# Patient Record
Sex: MALE | Race: WHITE | NOT HISPANIC OR LATINO | Employment: OTHER | URBAN - METROPOLITAN AREA
[De-identification: names, ages, dates, MRNs, and addresses within clinical notes are randomized per-mention and may not be internally consistent; named-entity substitution may affect disease eponyms.]

---

## 2020-09-10 ENCOUNTER — TELEPHONE (OUTPATIENT)
Dept: BARIATRICS/WEIGHT MGMT | Facility: CLINIC | Age: 40
End: 2020-09-10

## 2020-10-16 ENCOUNTER — TELEPHONE (OUTPATIENT)
Dept: BARIATRICS/WEIGHT MGMT | Facility: CLINIC | Age: 40
End: 2020-10-16

## 2020-11-04 ENCOUNTER — OFFICE VISIT (OUTPATIENT)
Dept: BARIATRICS/WEIGHT MGMT | Facility: CLINIC | Age: 40
End: 2020-11-04

## 2020-11-04 VITALS
OXYGEN SATURATION: 18 % | WEIGHT: 315 LBS | TEMPERATURE: 97.6 F | HEIGHT: 72 IN | HEART RATE: 87 BPM | RESPIRATION RATE: 18 BRPM | BODY MASS INDEX: 42.66 KG/M2 | DIASTOLIC BLOOD PRESSURE: 94 MMHG | SYSTOLIC BLOOD PRESSURE: 140 MMHG

## 2020-11-04 DIAGNOSIS — E66.01 OBESITY, CLASS III, BMI 40-49.9 (MORBID OBESITY) (HCC): Primary | ICD-10-CM

## 2020-11-04 PROCEDURE — 99203 OFFICE O/P NEW LOW 30 MIN: CPT | Performed by: NURSE PRACTITIONER

## 2020-11-04 RX ORDER — HYDROCHLOROTHIAZIDE 25 MG/1
25 TABLET ORAL DAILY
COMMUNITY
Start: 2020-09-01 | End: 2021-03-26 | Stop reason: SDUPTHER

## 2020-11-04 RX ORDER — OMEPRAZOLE 40 MG/1
40 CAPSULE, DELAYED RELEASE ORAL DAILY
Qty: 30 CAPSULE | Refills: 2 | Status: SHIPPED | OUTPATIENT
Start: 2020-11-04 | End: 2020-12-01

## 2020-11-04 RX ORDER — MULTIPLE VITAMINS W/ MINERALS TAB 9MG-400MCG
1 TAB ORAL DAILY
COMMUNITY
End: 2020-12-01

## 2020-11-04 NOTE — PROGRESS NOTES
[facility] WEIGHT [facility]NAGEMENT  [address] [place] [address]  [address] [place] [address]  Dept: [phone]  11/4/2020      Manish Castillo.  [id number]  [id number]  1980  male      Chief Complaint   Patient presents with   • Consult     SWL #1       The patient is here for month 1 of their pre-operative physician supervised diet. He had a current weight of 338 pounds. The patient states that he is following the recommendations given by our office and dietician including a high lean protein, low carb and low fat diet. We recommended adequate fruits and vegetable intake along with limited portion sizes. Patient is working on eliminating fast foods, fried foods, sweets and soda. Manish Castillo has been increasing his daily water intake. He has not been exercising.     Patient states they have made positive changes including working on cutting out sodas, no cigarette smoking   The patient admits to be struggling with mexican food and greasy, fatty foods     Gained weight ever since 1993 when had tonsils out     Breakfast: pyle egg and cheese sandwich, fried foods   Lunch: microwave meals- sausage egg and cheese foods, mexican foods   Dinner: 2 double beef and cheddar burgers  Snacks: taco bell and papa johns , varies   Drinks: milk 1 gallon a day, sodas PRN, water, orange juice   Exercise: limited due pain     Uses Marijuana  For pain control           Review of Systems   Constitutional: Positive for appetite change, fatigue and unexpected weight change.        Flori eating, large portion size    Respiratory: Negative.    Cardiovascular: Negative.    Gastrointestinal: Negative.    Musculoskeletal: Positive for arthralgias, back pain and myalgias.     Vitals:    11/04/20 1334   BP: 140/94   Pulse: 87   Resp: 18   Temp: 97.6 °F (36.4 °C)   SpO2: (!) 18%   O2 entered in error- actual o2 level 98%     There is no problem list on file for  this patient.    Body mass index is 45.52 kg/m².    The following portions of the patient's history were reviewed and updated as appropriate: active problem list, medication list, allergies, family history    Physical Exam  Constitutional:       Appearance: Normal appearance. He is obese.   Cardiovascular:      Rate and Rhythm: Normal rate and regular rhythm.   Pulmonary:      Effort: Pulmonary effort is normal.      Breath sounds: Normal breath sounds.   Abdominal:      General: Abdomen is flat. Bowel sounds are normal.      Palpations: Abdomen is soft.   Skin:     General: Skin is warm and dry.   Neurological:      General: No focal deficit present.      Mental Status: He is alert and oriented to person, place, and time. Mental status is at baseline.   Psychiatric:      Comments: Pt seems very anxious and on edge. Pt states he has a hx of PTSD and is easily set off by what people say or long lines. Pt does not currently see a counselor          Discussion/Plan:    New goals:  Eating and drinking separately with 1 meals/ day  Work on cutting out sodas     Pt having a lot of GERD- will prescribe omeprazole 40 mg daily for patient.     Obesity/Morbid Obesity: Currently the patient's weight is 338 pounds. There are no medications prescribed.Treatment plan includes prescribed diet, prescribed exercise regimen and behavior modification.    I reviewed the appropriate dietary choices with the patient and encouraged the necessary changes. Recommended at least 70 grams of protein per day, around 35 grams of fats and less than 100 grams of carbohydrates. Reviewed calorie intake if patient wanted to calorie count and/or had BMR. Instructed patient to drink half of body weight in ounces per day and exercise a minimum of 150 minutes per week including both cardio and strength training. Discussed the option of keeping a food journal which will help patient become more aware of the nutritional value of foods so they are more  prepared after surgery.    The patient was given written materials from our office for education.   I answered all of the patients questions regarding dietary changes, exercise or surgical options.  The patient will follow up in 1 month for Intake on December 1.     The total time spent face to face was 30 minutes with 25 minutes spent counseling.    Nuzhat Barksdale APRCECELIA  [facility]

## 2020-11-30 DIAGNOSIS — E66.01 OBESITY, CLASS III, BMI 40-49.9 (MORBID OBESITY) (HCC): Primary | ICD-10-CM

## 2020-11-30 DIAGNOSIS — R63.5 ABNORMAL WEIGHT GAIN: ICD-10-CM

## 2020-11-30 DIAGNOSIS — R79.9 ABNORMAL FINDING OF BLOOD CHEMISTRY, UNSPECIFIED: ICD-10-CM

## 2020-11-30 DIAGNOSIS — M25.50 ARTHRALGIA, UNSPECIFIED JOINT: ICD-10-CM

## 2020-12-01 ENCOUNTER — OFFICE VISIT (OUTPATIENT)
Dept: BARIATRICS/WEIGHT MGMT | Facility: CLINIC | Age: 40
End: 2020-12-01

## 2020-12-01 ENCOUNTER — OFFICE VISIT (OUTPATIENT)
Dept: PSYCHIATRY | Facility: CLINIC | Age: 40
End: 2020-12-01

## 2020-12-01 VITALS — HEIGHT: 72 IN | WEIGHT: 315 LBS | BODY MASS INDEX: 42.66 KG/M2

## 2020-12-01 DIAGNOSIS — Z01.818 PRE-OPERATIVE EXAMINATION: ICD-10-CM

## 2020-12-01 DIAGNOSIS — E66.01 MORBID OBESITY (HCC): Primary | ICD-10-CM

## 2020-12-01 DIAGNOSIS — E66.01 OBESITY, CLASS III, BMI 40-49.9 (MORBID OBESITY) (HCC): Primary | ICD-10-CM

## 2020-12-01 PROCEDURE — 90791 PSYCH DIAGNOSTIC EVALUATION: CPT | Performed by: PSYCHIATRY & NEUROLOGY

## 2020-12-01 PROCEDURE — 99443 PR PHYS/QHP TELEPHONE EVALUATION 21-30 MIN: CPT | Performed by: NURSE PRACTITIONER
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
## 2020-12-01 NOTE — PATIENT INSTRUCTIONS
"BMI for Adults  What is BMI?  Body mass index (BMI) is a number that is calculated from a person's weight and height. BMI can help estimate how much of a person's weight is composed of fat. BMI does not measure body fat directly. Rather, it is an alternative to procedures that directly measure body fat, which can be difficult and expensive.  BMI can help identify people who may be at higher risk for certain medical problems.  What are BMI measurements used for?  BMI is used as a screening tool to identify possible weight problems. It helps determine whether a person is obese, overweight, a healthy weight, or underweight.  BMI is useful for:  · Identifying a weight problem that may be related to a medical condition or may increase the risk for medical problems.  · Promoting changes, such as changes in diet and exercise, to help reach a healthy weight. BMI screening can be repeated to see if these changes are working.  How is BMI calculated?  BMI involves measuring your weight in relation to your height. Both height and weight are measured, and the BMI is calculated from those numbers. This can be done either in English (U.S.) or metric measurements. Note that charts and online BMI calculators are available to help you find your BMI quickly and easily without having to do these calculations yourself.  To calculate your BMI in English (U.S.) measurements:    1. Measure your weight in pounds (lb).  2. Multiply the number of pounds by 703.  ? For example, for a person who weighs 180 lb, multiply that number by 703, which equals 126,540.  3. Measure your height in inches. Then multiply that number by itself to get a measurement called \"inches squared.\"  ? For example, for a person who is 70 inches tall, the \"inches squared\" measurement is 70 inches x 70 inches, which equals 4,900 inches squared.  4. Divide the total from step 2 (number of lb x 703) by the total from step 3 (inches squared): 126,540 ÷ 4,900 = 25.8. This is " "your BMI.  To calculate your BMI in metric measurements:  1. Measure your weight in kilograms (kg).  2. Measure your height in meters (m). Then multiply that number by itself to get a measurement called \"meters squared.\"  ? For example, for a person who is 1.75 m tall, the \"meters squared\" measurement is 1.75 m x 1.75 m, which is equal to 3.1 meters squared.  3. Divide the number of kilograms (your weight) by the meters squared number. In this example: 70 ÷ 3.1 = 22.6. This is your BMI.  What do the results mean?  BMI charts are used to identify whether you are underweight, normal weight, overweight, or obese. The following guidelines will be used:  · Underweight: BMI less than 18.5.  · Normal weight: BMI between 18.5 and 24.9.  · Overweight: BMI between 25 and 29.9.  · Obese: BMI of 30 or above.  Keep these notes in mind:  · Weight includes both fat and muscle, so someone with a muscular build, such as an athlete, may have a BMI that is higher than 24.9. In cases like these, BMI is not an accurate measure of body fat.  · To determine if excess body fat is the cause of a BMI of 25 or higher, further assessments may need to be done by a health care provider.  · BMI is usually interpreted in the same way for men and women.  Where to find more information  For more information about BMI, including tools to quickly calculate your BMI, go to these websites:  · Centers for Disease Control and Prevention: www.cdc.gov  · American Heart Association: www.heart.org  · National Heart, Lung, and Blood [place]: www.nhlbi.nih.gov  Summary  · Body mass index (BMI) is a number that is calculated from a person's weight and height.  · BMI may help estimate how much of a person's weight is composed of fat. BMI can help identify those who may be at higher risk for certain medical problems.  · BMI can be measured using English measurements or metric measurements.  · BMI charts are used to identify whether you are underweight, normal " weight, overweight, or obese.  This information is not intended to replace advice given to you by your health care provider. Make sure you discuss any questions you have with your health care provider.  Document Revised: 09/09/2020 Document Reviewed: 07/17/2020  Elsevier Patient Education © 2020 [employer]
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
## 2020-12-02 DIAGNOSIS — E66.01 OBESITY, CLASS III, BMI 40-49.9 (MORBID OBESITY) (HCC): ICD-10-CM

## 2020-12-21 ENCOUNTER — OFFICE VISIT (OUTPATIENT)
Dept: CARDIOLOGY | Facility: CLINIC | Age: 40
End: 2020-12-21

## 2020-12-21 VITALS
TEMPERATURE: 97.8 F | SYSTOLIC BLOOD PRESSURE: 140 MMHG | HEART RATE: 101 BPM | HEIGHT: 72 IN | WEIGHT: 315 LBS | BODY MASS INDEX: 42.66 KG/M2 | OXYGEN SATURATION: 98 % | DIASTOLIC BLOOD PRESSURE: 97 MMHG

## 2020-12-21 DIAGNOSIS — E66.01 MORBID OBESITY (HCC): ICD-10-CM

## 2020-12-21 DIAGNOSIS — I10 ESSENTIAL HYPERTENSION: ICD-10-CM

## 2020-12-21 DIAGNOSIS — Z01.810 PREOPERATIVE CARDIOVASCULAR EXAMINATION: Primary | ICD-10-CM

## 2020-12-21 DIAGNOSIS — R94.31 ABNORMAL EKG: ICD-10-CM

## 2020-12-21 DIAGNOSIS — R06.09 DYSPNEA ON EXERTION: ICD-10-CM

## 2020-12-21 PROCEDURE — 93000 ELECTROCARDIOGRAM COMPLETE: CPT | Performed by: INTERNAL MEDICINE

## 2020-12-21 PROCEDURE — 99204 OFFICE O/P NEW MOD 45 MIN: CPT | Performed by: INTERNAL MEDICINE

## 2020-12-21 NOTE — PROGRESS NOTES
Subjective:     Encounter Date:12/21/2020      Patient ID: Manish Castillo is a 40 y.o. male.    Referring physician : Dr. Carver  Reason for consult: New patient preop for bariatric surgery, morbid obesity  History of Present Illness      This is a 40-year-old with PMH of    -Hypertension  -Morbid obesity  -On disability due to psych disorder PTSD and panic attacks  -Positive family history of CAD in mother    Here for preop evaluation for bariatric surgery.  Patient is not very active and is on disability.  Has dyspnea on exertion with up hills and stairs and sometimes has shortness of breath at rest at nighttime.  Patient thinks it is because of his smoking.  Patient has hypertension and is gained weight and blood pressure is creeping up.  Denies any chest pain.      Assessment :    Dyspnea on exertion  Preoperative cardiovascular evaluation for bariatric surgery  Abnormal EKG  Morbid obesity  Hypertension  Positive family history of heart disease      Recommendations and plan :    Reviewed EKG results with patient  Advised him to check blood pressure at home and bring readings.  We will follow-up and consider adding antihypertensives.  Patient had labs ordered will review the labs done including CMP, lipid profile and hemoglobin A1c.  We will schedule for stress Cardiolite.  Patient is not sure if he can walk on treadmill.  Will do Lexiscan if he cannot walk.  Counseled on smoking and marijuana cessation.  We will follow-up after testing and consider further evaluation and treatment.      Assessment:          Diagnosis Plan   1. Preoperative cardiovascular examination  Stress Test With Myocardial Perfusion One Day    ECG 12 Lead   2. Dyspnea on exertion  Stress Test With Myocardial Perfusion One Day    ECG 12 Lead   3. Abnormal EKG  Stress Test With Myocardial Perfusion One Day    ECG 12 Lead   4. BMI 45.0-49.9, adult (CMS/[facility])  Stress Test With Myocardial Perfusion One Day    ECG 12 Lead   5. Morbid obesity  (CMS/HCC)  Stress Test With Myocardial Perfusion One Day    ECG 12 Lead   6. Essential hypertension  Stress Test With Myocardial Perfusion One Day    ECG 12 Lead          Plan:         Past Medical History:  Past Medical History:   Diagnosis Date   • Episodic mood disorder (CMS/HCC)    • Hypertension    • Panic attacks    • PTSD (post-traumatic stress disorder)      Past Surgical History:  Past Surgical History:   Procedure Laterality Date   • TONSILLECTOMY  1993      Allergies:  No Known Allergies  Home Meds:  Current Meds:     Current Outpatient Medications:   •  hydroCHLOROthiazide (HYDRODIURIL) 25 MG tablet, Take 25 mg by mouth Daily. 200001, Disp: , Rfl:   Social History:   Social History     Tobacco Use   • Smoking status: Former Smoker     Types: Cigarettes   • Smokeless tobacco: Never Used   Substance Use Topics   • Alcohol use: Yes     Comment: daily- [place] and coke       Family History:  Family History   Problem Relation Age of Onset   • Heart disease Mother    • Hypertension Mother         The following portions of the patient's history were reviewed and updated as appropriate: allergies, current medications, past family history, past medical history, past social history, past surgical history and problem list.  And physical exam      Review of Systems   Constitution: Negative for fever and malaise/fatigue.   HENT: Negative for congestion and hearing loss.    Eyes: Negative for double vision and visual disturbance.   Cardiovascular: Positive for palpitations. Negative for chest pain, claudication, dyspnea on exertion, leg swelling and syncope.   Respiratory: Positive for shortness of breath. Negative for cough.    Endocrine: Negative for cold intolerance.   Skin: Negative for color change and rash.   Musculoskeletal: Negative for arthritis and joint pain.   Gastrointestinal: Negative for abdominal pain and heartburn.   Genitourinary: Negative for hematuria.   Neurological: Negative for excessive  "daytime sleepiness and dizziness.   Psychiatric/Behavioral: Negative for depression. The patient is not nervous/anxious.    All other systems reviewed and are negative.          ECG 12 Lead    Date/Time: 12/21/2020 2:40 PM  Performed by: Darren Nice MD  Authorized by: Darren Nice MD   Comparison: not compared with previous ECG   Previous ECG: no previous ECG available  Comments: EKG done today reviewed by me shows sinus rhythm with rate of 93 bpm with sinus arrhythmia and T wave inversions in inferior leads nonspecific.,  There is no comparison EKG available.               Objective:     Physical Exam  /97 (BP Location: Left arm, Patient Position: Sitting, Cuff Size: Large Adult)   Pulse 101   Temp 97.8 °F (36.6 °C)   Ht 182.9 cm (72\")   Wt (!) 156 kg (345 lb)   SpO2 98%   BMI 46.79 kg/m²   General:  Appears in no acute distress, pleasant obese  Eyes: Sclera is anicteric,  conjunctiva is clear   HEENT:  No JVD. Thyroid not visibly enlarged. No mucosal pallor or cyanosis  Respiratory: Respirations regular and unlabored at rest.  Bilaterally good breath sounds, with good air entry in all fields. No crackles, rubs or wheezes auscultated  Cardiovascular: S1,S2 Regular rate and rhythm. No murmur, rub or gallop auscultated. No pretibial pitting edema  Gastrointestinal: Abdomen soft, flat, non tender. Bowel sounds present.   Musculoskeletal:  No abnormal movements  Extremities: No digital clubbing or cyanosis  Skin: Color pink. Skin warm and dry to touch. No rashes  No xanthoma  Neuro: Alert and awake, no lateralizing deficits appreciated    Lab Reviewed:                  "

## 2021-01-05 ENCOUNTER — PREP FOR SURGERY (OUTPATIENT)
Dept: OTHER | Facility: HOSPITAL | Age: 41
End: 2021-01-05

## 2021-01-05 DIAGNOSIS — E66.01 OBESITY, CLASS III, BMI 40-49.9 (MORBID OBESITY): Primary | ICD-10-CM

## 2021-01-05 RX ORDER — SODIUM CHLORIDE, SODIUM LACTATE, POTASSIUM CHLORIDE, CALCIUM CHLORIDE 600; 310; 30; 20 MG/100ML; MG/100ML; MG/100ML; MG/100ML
30 INJECTION, SOLUTION INTRAVENOUS CONTINUOUS PRN
Status: CANCELLED | OUTPATIENT
Start: 2021-01-05

## 2021-01-07 ENCOUNTER — HOSPITAL ENCOUNTER (OUTPATIENT)
Dept: NUCLEAR MEDICINE | Facility: HOSPITAL | Age: 41
Discharge: HOME | End: 2021-01-07

## 2021-01-07 DIAGNOSIS — E66.01 MORBID OBESITY (HCC): ICD-10-CM

## 2021-01-07 DIAGNOSIS — R94.31 ABNORMAL EKG: ICD-10-CM

## 2021-01-07 DIAGNOSIS — I10 ESSENTIAL HYPERTENSION: ICD-10-CM

## 2021-01-07 DIAGNOSIS — Z01.810 PREOPERATIVE CARDIOVASCULAR EXAMINATION: ICD-10-CM

## 2021-01-07 DIAGNOSIS — R06.09 DYSPNEA ON EXERTION: ICD-10-CM

## 2021-01-07 PROCEDURE — 78452 HT MUSCLE IMAGE SPECT MULT: CPT

## 2021-01-07 PROCEDURE — 93017 CV STRESS TEST TRACING ONLY: CPT

## 2021-01-07 PROCEDURE — A9500 TC99M SESTAMIBI: HCPCS | Performed by: INTERNAL MEDICINE

## 2021-01-07 PROCEDURE — 78452 HT MUSCLE IMAGE SPECT MULT: CPT | Performed by: INTERNAL MEDICINE

## 2021-01-07 PROCEDURE — 0 TECHNETIUM SESTAMIBI: Performed by: INTERNAL MEDICINE

## 2021-01-07 PROCEDURE — 93018 CV STRESS TEST I&R ONLY: CPT | Performed by: INTERNAL MEDICINE

## 2021-01-07 RX ADMIN — TECHNETIUM TC 99M SESTAMIBI 1 DOSE: 1 INJECTION INTRAVENOUS at 11:55

## 2021-01-07 RX ADMIN — TECHNETIUM TC 99M SESTAMIBI 1 DOSE: 1 INJECTION INTRAVENOUS at 13:30

## 2021-01-13 LAB
BH CV NUCLEAR PRIOR STUDY: 3
BH CV STRESS BP STAGE 1: NORMAL
BH CV STRESS BP STAGE 2: NORMAL
BH CV STRESS DURATION MIN STAGE 1: 3
BH CV STRESS DURATION MIN STAGE 2: 2
BH CV STRESS DURATION SEC STAGE 1: 0
BH CV STRESS DURATION SEC STAGE 2: 16
BH CV STRESS GRADE STAGE 1: 10
BH CV STRESS GRADE STAGE 2: 12
BH CV STRESS HR STAGE 1: 132
BH CV STRESS HR STAGE 2: 156
BH CV STRESS METS STAGE 1: 5
BH CV STRESS METS STAGE 2: 7.5
BH CV STRESS PROTOCOL 1: NORMAL
BH CV STRESS RECOVERY HR: 113 BPM
BH CV STRESS SPEED STAGE 1: 1.7
BH CV STRESS SPEED STAGE 2: 2.5
BH CV STRESS STAGE 1: 1
BH CV STRESS STAGE 2: 2
MAXIMAL PREDICTED HEART RATE: 182 BPM
PERCENT MAX PREDICTED HR: 88.46 %
STRESS BASELINE BP: NORMAL MMHG
STRESS BASELINE HR: 106 BPM
STRESS PERCENT HR: 104 %
STRESS POST PEAK BP: NORMAL MMHG
STRESS POST PEAK HR: 161 BPM
STRESS TARGET HR: 155 BPM

## 2021-01-14 ENCOUNTER — TELEPHONE (OUTPATIENT)
Dept: CARDIOLOGY | Facility: CLINIC | Age: 41
End: 2021-01-14

## 2021-01-14 DIAGNOSIS — R94.39 ABNORMAL NUCLEAR STRESS TEST: ICD-10-CM

## 2021-01-14 DIAGNOSIS — I10 ESSENTIAL HYPERTENSION: ICD-10-CM

## 2021-01-14 DIAGNOSIS — R94.31 ABNORMAL EKG: ICD-10-CM

## 2021-01-14 DIAGNOSIS — Z01.810 PREOPERATIVE CARDIOVASCULAR EXAMINATION: ICD-10-CM

## 2021-01-14 DIAGNOSIS — R06.09 DYSPNEA ON EXERTION: Primary | ICD-10-CM

## 2021-01-14 RX ORDER — ISOSORBIDE MONONITRATE 30 MG/1
30 TABLET, EXTENDED RELEASE ORAL EVERY MORNING
Qty: 30 TABLET | Refills: 11 | Status: ON HOLD | OUTPATIENT
Start: 2021-01-14 | End: 2021-01-27

## 2021-01-14 RX ORDER — ASPIRIN 81 MG/1
81 TABLET ORAL DAILY
Qty: 30 TABLET | Refills: 11 | Status: SHIPPED | OUTPATIENT
Start: 2021-01-14 | End: 2021-01-25

## 2021-01-14 RX ORDER — METOPROLOL SUCCINATE 25 MG/1
25 TABLET, EXTENDED RELEASE ORAL DAILY
Qty: 30 TABLET | Refills: 11 | Status: ON HOLD | OUTPATIENT
Start: 2021-01-14 | End: 2021-01-27

## 2021-01-18 PROBLEM — Z01.810 PREOPERATIVE CARDIOVASCULAR EXAMINATION: Status: ACTIVE | Noted: 2021-01-18

## 2021-01-18 PROBLEM — I10 ESSENTIAL HYPERTENSION: Status: ACTIVE | Noted: 2021-01-18

## 2021-01-18 PROBLEM — R94.31 ABNORMAL EKG: Status: ACTIVE | Noted: 2021-01-18

## 2021-01-18 PROBLEM — R06.09 DYSPNEA ON EXERTION: Status: ACTIVE | Noted: 2021-01-18

## 2021-01-18 PROBLEM — R94.39 ABNORMAL NUCLEAR STRESS TEST: Status: ACTIVE | Noted: 2021-01-18

## 2021-01-27 ENCOUNTER — HOSPITAL ENCOUNTER (OUTPATIENT)
Dept: GENERAL RADIOLOGY | Facility: HOSPITAL | Age: 41
Setting detail: HOSPITAL OUTPATIENT SURGERY
Discharge: HOME | End: 2021-01-27

## 2021-01-27 ENCOUNTER — HOSPITAL ENCOUNTER (OUTPATIENT)
Facility: HOSPITAL | Age: 41
Setting detail: HOSPITAL OUTPATIENT SURGERY
Discharge: HOME | End: 2021-01-27
Attending: INTERNAL MEDICINE | Admitting: INTERNAL MEDICINE

## 2021-01-27 ENCOUNTER — LAB (OUTPATIENT)
Dept: LAB | Facility: HOSPITAL | Age: 41
End: 2021-01-27

## 2021-01-27 VITALS
HEART RATE: 94 BPM | WEIGHT: 315 LBS | RESPIRATION RATE: 16 BRPM | TEMPERATURE: 97.5 F | HEIGHT: 72 IN | BODY MASS INDEX: 42.66 KG/M2 | SYSTOLIC BLOOD PRESSURE: 151 MMHG | OXYGEN SATURATION: 99 % | DIASTOLIC BLOOD PRESSURE: 85 MMHG

## 2021-01-27 DIAGNOSIS — R06.09 DYSPNEA ON EXERTION: ICD-10-CM

## 2021-01-27 DIAGNOSIS — I10 ESSENTIAL HYPERTENSION: ICD-10-CM

## 2021-01-27 DIAGNOSIS — R94.31 ABNORMAL EKG: ICD-10-CM

## 2021-01-27 DIAGNOSIS — Z01.810 PREOPERATIVE CARDIOVASCULAR EXAMINATION: ICD-10-CM

## 2021-01-27 DIAGNOSIS — R94.39 ABNORMAL NUCLEAR STRESS TEST: ICD-10-CM

## 2021-01-27 LAB
ANION GAP SERPL CALCULATED.3IONS-SCNC: 10 MMOL/L (ref 5–15)
BILIRUB UR QL STRIP: NEGATIVE
BUN SERPL-MCNC: 27 MG/DL (ref 6–20)
BUN/CREAT SERPL: 17.3 (ref 7–25)
CALCIUM SPEC-SCNC: 10.2 MG/DL (ref 8.6–10.5)
CHLORIDE SERPL-SCNC: 101 MMOL/L (ref 98–107)
CLARITY UR: CLEAR
CO2 SERPL-SCNC: 31 MMOL/L (ref 22–29)
COLOR UR: YELLOW
CREAT SERPL-MCNC: 1.56 MG/DL (ref 0.76–1.27)
DEPRECATED RDW RBC AUTO: 41.6 FL (ref 37–54)
ERYTHROCYTE [DISTWIDTH] IN BLOOD BY AUTOMATED COUNT: 13.9 % (ref 12.3–15.4)
GFR SERPL CREATININE-BSD FRML MDRD: 50 ML/MIN/1.73
GLUCOSE SERPL-MCNC: 111 MG/DL (ref 65–99)
GLUCOSE UR STRIP-MCNC: NEGATIVE MG/DL
HCT VFR BLD AUTO: 42.5 % (ref 37.5–51)
HGB BLD-MCNC: 15 G/DL (ref 13–17.7)
HGB UR QL STRIP.AUTO: NEGATIVE
KETONES UR QL STRIP: NEGATIVE
LEUKOCYTE ESTERASE UR QL STRIP.AUTO: NEGATIVE
MCH RBC QN AUTO: 29.9 PG (ref 26.6–33)
MCHC RBC AUTO-ENTMCNC: 35.2 G/DL (ref 31.5–35.7)
MCV RBC AUTO: 85.1 FL (ref 79–97)
NITRITE UR QL STRIP: NEGATIVE
PH UR STRIP.AUTO: 6 [PH] (ref 5–8)
PLATELET # BLD AUTO: 263 10*3/MM3 (ref 140–450)
PMV BLD AUTO: 8.3 FL (ref 6–12)
POTASSIUM SERPL-SCNC: 4.3 MMOL/L (ref 3.5–5.2)
PROT UR QL STRIP: NEGATIVE
RBC # BLD AUTO: 4.99 10*6/MM3 (ref 4.14–5.8)
SARS-COV-2 RNA PNL SPEC NAA+PROBE: NOT DETECTED
SODIUM SERPL-SCNC: 142 MMOL/L (ref 136–145)
SP GR UR STRIP: 1.02 (ref 1–1.03)
UROBILINOGEN UR QL STRIP: NORMAL
WBC # BLD AUTO: 8.1 10*3/MM3 (ref 3.4–10.8)

## 2021-01-27 PROCEDURE — 85027 COMPLETE CBC AUTOMATED: CPT

## 2021-01-27 PROCEDURE — C9803 HOPD COVID-19 SPEC COLLECT: HCPCS

## 2021-01-27 PROCEDURE — 25010000002 MIDAZOLAM PER 1 MG: Performed by: INTERNAL MEDICINE

## 2021-01-27 PROCEDURE — C1894 INTRO/SHEATH, NON-LASER: HCPCS | Performed by: INTERNAL MEDICINE

## 2021-01-27 PROCEDURE — 80048 BASIC METABOLIC PNL TOTAL CA: CPT

## 2021-01-27 PROCEDURE — C1769 GUIDE WIRE: HCPCS | Performed by: INTERNAL MEDICINE

## 2021-01-27 PROCEDURE — 93458 L HRT ARTERY/VENTRICLE ANGIO: CPT | Performed by: INTERNAL MEDICINE

## 2021-01-27 PROCEDURE — 99152 MOD SED SAME PHYS/QHP 5/>YRS: CPT | Performed by: INTERNAL MEDICINE

## 2021-01-27 PROCEDURE — 36415 COLL VENOUS BLD VENIPUNCTURE: CPT

## 2021-01-27 PROCEDURE — 71046 X-RAY EXAM CHEST 2 VIEWS: CPT

## 2021-01-27 PROCEDURE — 25010000002 FENTANYL CITRATE (PF) 100 MCG/2ML SOLUTION: Performed by: INTERNAL MEDICINE

## 2021-01-27 PROCEDURE — U0003 INFECTIOUS AGENT DETECTION BY NUCLEIC ACID (DNA OR RNA); SEVERE ACUTE RESPIRATORY SYNDROME CORONAVIRUS 2 (SARS-COV-2) (CORONAVIRUS DISEASE [COVID-19]), AMPLIFIED PROBE TECHNIQUE, MAKING USE OF HIGH THROUGHPUT TECHNOLOGIES AS DESCRIBED BY CMS-2020-01-R: HCPCS

## 2021-01-27 PROCEDURE — 99153 MOD SED SAME PHYS/QHP EA: CPT | Performed by: INTERNAL MEDICINE

## 2021-01-27 PROCEDURE — 81003 URINALYSIS AUTO W/O SCOPE: CPT | Performed by: INTERNAL MEDICINE

## 2021-01-27 PROCEDURE — 0 IOPAMIDOL PER 1 ML: Performed by: INTERNAL MEDICINE

## 2021-01-27 RX ORDER — LIDOCAINE HYDROCHLORIDE 20 MG/ML
INJECTION, SOLUTION INFILTRATION; PERINEURAL AS NEEDED
Status: DISCONTINUED | OUTPATIENT
Start: 2021-01-27 | End: 2021-01-27 | Stop reason: HOSPADM

## 2021-01-27 RX ORDER — NICARDIPINE HYDROCHLORIDE 2.5 MG/ML
INJECTION INTRAVENOUS AS NEEDED
Status: DISCONTINUED | OUTPATIENT
Start: 2021-01-27 | End: 2021-01-27 | Stop reason: HOSPADM

## 2021-01-27 RX ORDER — ACETAMINOPHEN 325 MG/1
650 TABLET ORAL EVERY 4 HOURS PRN
Status: DISCONTINUED | OUTPATIENT
Start: 2021-01-27 | End: 2021-01-27 | Stop reason: HOSPADM

## 2021-01-27 RX ORDER — SODIUM CHLORIDE 9 MG/ML
100 INJECTION, SOLUTION INTRAVENOUS CONTINUOUS
Status: DISCONTINUED | OUTPATIENT
Start: 2021-01-27 | End: 2021-01-27 | Stop reason: HOSPADM

## 2021-01-27 RX ORDER — SODIUM CHLORIDE 9 MG/ML
250 INJECTION, SOLUTION INTRAVENOUS ONCE AS NEEDED
Status: DISCONTINUED | OUTPATIENT
Start: 2021-01-27 | End: 2021-01-27 | Stop reason: HOSPADM

## 2021-01-27 RX ORDER — FENTANYL CITRATE 50 UG/ML
INJECTION, SOLUTION INTRAMUSCULAR; INTRAVENOUS AS NEEDED
Status: DISCONTINUED | OUTPATIENT
Start: 2021-01-27 | End: 2021-01-27 | Stop reason: HOSPADM

## 2021-01-27 RX ORDER — ISOSORBIDE MONONITRATE 30 MG/1
30 TABLET, EXTENDED RELEASE ORAL DAILY
COMMUNITY
End: 2021-01-27 | Stop reason: HOSPADM

## 2021-01-27 RX ORDER — SODIUM CHLORIDE 9 MG/ML
30 INJECTION, SOLUTION INTRAVENOUS CONTINUOUS
Status: DISCONTINUED | OUTPATIENT
Start: 2021-01-27 | End: 2021-01-27 | Stop reason: HOSPADM

## 2021-01-27 RX ORDER — NITROGLYCERIN 5 MG/ML
INJECTION, SOLUTION INTRAVENOUS AS NEEDED
Status: DISCONTINUED | OUTPATIENT
Start: 2021-01-27 | End: 2021-01-27 | Stop reason: HOSPADM

## 2021-01-27 RX ORDER — ASPIRIN 81 MG/1
81 TABLET ORAL DAILY
Qty: 100 TABLET | Refills: 3 | Status: SHIPPED | OUTPATIENT
Start: 2021-01-27 | End: 2022-03-24 | Stop reason: SDUPTHER

## 2021-01-27 RX ORDER — MIDAZOLAM HYDROCHLORIDE 1 MG/ML
INJECTION INTRAMUSCULAR; INTRAVENOUS AS NEEDED
Status: DISCONTINUED | OUTPATIENT
Start: 2021-01-27 | End: 2021-01-27 | Stop reason: HOSPADM

## 2021-01-27 RX ORDER — METOPROLOL SUCCINATE 25 MG/1
25 TABLET, EXTENDED RELEASE ORAL DAILY
COMMUNITY
End: 2021-03-26 | Stop reason: SDUPTHER

## 2021-01-27 RX ADMIN — Medication 600 MG: at 09:38

## 2021-01-27 RX ADMIN — ACETAMINOPHEN 650 MG: 325 TABLET, FILM COATED ORAL at 11:38

## 2021-01-27 RX ADMIN — Medication 600 MG: at 18:21

## 2021-01-27 RX ADMIN — SODIUM CHLORIDE 100 ML/HR: 9 INJECTION, SOLUTION INTRAVENOUS at 09:38

## 2021-01-27 RX ADMIN — SODIUM CHLORIDE 30 ML/HR: 9 INJECTION, SOLUTION INTRAVENOUS at 09:07

## 2021-01-28 ENCOUNTER — OFFICE VISIT (OUTPATIENT)
Dept: BARIATRICS/WEIGHT MGMT | Facility: CLINIC | Age: 41
End: 2021-01-28

## 2021-01-28 ENCOUNTER — TELEPHONE (OUTPATIENT)
Dept: BARIATRICS/WEIGHT MGMT | Facility: CLINIC | Age: 41
End: 2021-01-28

## 2021-01-28 DIAGNOSIS — E66.01 OBESITY, CLASS III, BMI 40-49.9 (MORBID OBESITY) (HCC): Primary | ICD-10-CM

## 2021-01-28 PROCEDURE — 99443 PR PHYS/QHP TELEPHONE EVALUATION 21-30 MIN: CPT | Performed by: NURSE PRACTITIONER

## 2021-01-28 NOTE — PROGRESS NOTES
[facility] BAR SURG [facility] MEDICAL GROUP WEIGHT MANAGEMENT  [address] [place] [address]  [address] [place] [address]  Dept: [phone]  1/28/2021      Manish Castillo.  [id number]  [id number]  1980  male    You have chosen to receive care through a telephone call. DO you consent to use a telephone call for your medical care today? Yes    SWL # 3  Current weight: 247 pounds     The patient is here for month 3 of their pre-operative physician supervised diet. He had a gain of 2 lbs. The patient states that he is following the recommendations given by our office and dietician including a high lean protein, low carb and low fat diet. We recommended adequate fruits and vegetable intake along with limited portion sizes. Patient is working on eliminating fast foods, fried foods, sweets and soda. Manish Castillo has been increasing his daily water intake. He has been exercising: none due to back pain.    Patient states they have made positive changes including none  The patient admits to be struggling with quitting marijuana , exercise due to pain    Past goals:  Eating and drinking separately with 1 meals/ day- not met  Work on cutting out sodas - not met  Work on cutting out cannabis and cutting out alcohol - not met     Breakfast: sometimes doesn't eat breakfast, pyle, eggs and cheese or cereal with skim milk   Lunch: microwave frozen meals, gertrude steak , pizza bites  Dinner: microwave meals   Snacks: no potato chips or cookies or sweets   Drinks: Sprite sugar free 2 L a day,  jennifer beam and coke   Exercise: has back pain   Had heart cath yesterday     Review of Systems   Constitutional: Positive for activity change, appetite change and fatigue.   Respiratory: Negative.    Cardiovascular: Negative.    Gastrointestinal: Negative.    Musculoskeletal: Positive for arthralgias, back pain and myalgias.     Height 72 inches, weight 347 pounds, BMI 47.1    Patient Active  Problem List   Diagnosis   • Morbid obesity (CMS/[facility])   • BMI 45.0-49.9, adult (CMS/[facility])   • Pre-operative examination   • Dyspnea on exertion   • Preoperative cardiovascular examination   • Abnormal EKG   • Essential hypertension   • Abnormal nuclear stress test       The following portions of the patient's history were reviewed and updated as appropriate: active problem list, medication list, allergies, social history, notes from last encounter    Physical Exam  Cardiovascular:      Comments: Not able to assess due to telephone call  Pulmonary:      Comments: Not able to assess due to telephone call  Abdominal:      Comments: Not able to assess due to telephone call   Neurological:      General: No focal deficit present.      Mental Status: He is alert and oriented to person, place, and time.   Psychiatric:         Mood and Affect: Mood normal.         Behavior: Behavior normal.         Thought Content: Thought content normal.         Judgment: Judgment normal.     limited due to telephone call     Discussion/Plan:    New goals:  Eating and drinking separately with 1 meals/ day  Work on cutting out sodas   Work on cutting out cannabis and cutting out alcohol- pt wants to quit smoking 30 days prior to surgery - wants to be given a quit date   Limit frozen freezer meals    Will give pt scale at next in person visit.     Obesity/Morbid Obesity: Currently the patient's weight is increased. There are no medications prescribed.Treatment plan includes prescribed diet, prescribed exercise regimen and behavior modification.    I reviewed the appropriate dietary choices with the patient and encouraged the necessary changes. Recommended at least 70 grams of protein per day, around 35 grams of fats and less than 100 grams of carbohydrates. Reviewed calorie intake if patient wanted to calorie count and/or had BMR. Instructed patient to drink half of body weight in ounces per day and exercise a minimum of 150 minutes per week  including both cardio and strength training. Discussed the option of keeping a food journal which will help patient become more aware of the nutritional value of foods so they are more prepared after surgery.    The patient was given written materials from our office for education.   I answered all of the patients questions regarding dietary changes, exercise or surgical options.  The patient will follow up in 1 month. The total time spent face to face was 25 minutes with 20 minutes spent counseling.    JOSUÉ Osei  [facility] Bariatrics and General Surgery

## 2021-02-03 ENCOUNTER — OFFICE VISIT (OUTPATIENT)
Dept: BARIATRICS/WEIGHT MGMT | Facility: CLINIC | Age: 41
End: 2021-02-03

## 2021-02-03 VITALS
BODY MASS INDEX: 42.66 KG/M2 | TEMPERATURE: 98.6 F | HEIGHT: 72 IN | WEIGHT: 315 LBS | OXYGEN SATURATION: 98 % | DIASTOLIC BLOOD PRESSURE: 81 MMHG | RESPIRATION RATE: 18 BRPM | SYSTOLIC BLOOD PRESSURE: 116 MMHG | HEART RATE: 94 BPM

## 2021-02-03 DIAGNOSIS — E66.01 OBESITY, CLASS III, BMI 40-49.9 (MORBID OBESITY) (HCC): Primary | ICD-10-CM

## 2021-02-03 DIAGNOSIS — F12.20 TETRAHYDROCANNABINOL (THC) DEPENDENCE (HCC): ICD-10-CM

## 2021-02-03 PROCEDURE — 99214 OFFICE O/P EST MOD 30 MIN: CPT | Performed by: NURSE PRACTITIONER

## 2021-02-03 NOTE — PROGRESS NOTES
[facility] SUR[facility] MEDICAL GROUP WEIGHT MANAGEMENT  [address] [place] [address]  [address] [place] [address]  Dept: [phone]  2/3/2021      Manish Castillo.  [id number]  [id number]  1980  male      Chief Complaint   Patient presents with   • Follow-up     SWL #2       The patient is here for month 4 of their pre-operative physician supervised diet. He had a gain of 1 lbs. The patient states that he is following the recommendations given by our office and dietician including a high lean protein, low carb and low fat diet. We recommended adequate fruits and vegetable intake along with limited portion sizes. Patient is working on eliminating fast foods, fried foods, sweets and soda. Manish Castillo has been increasing his daily water intake. He has been exercising: none.    Patient states they have made positive changes including none  The patient admits to be struggling with quitting marijuana, fast food, frozen meals, exercising     Breakfast: [employer] pyle egg cheese and hash browns - coffee  Skim milk , diet sugar free spite   microwaveable chicken, hot pockets, breakfast burrito   Subway - 2 sandwiches a day   Exercise:  limited due to back pain     Pt lives in [place], [place] near [facility] - in need of a new PCP, was seen at [facility] before and now wants a new PCP   Pt is on a very limited income and gets food stamps/ disability each month     Past goals:   Eating and drinking separately with 1 meals/ day- not met   Work on cutting out sodas - partially met   Work on cutting out cannabis and cutting out alcohol- pt wants to quit smoking 30 days prior to surgery - wants to be given a quit date - not met  Limit frozen freezer meals- not met     Review of Systems   Constitutional: Positive for activity change, appetite change and fatigue.   Respiratory: Negative.    Cardiovascular: Negative.    Musculoskeletal: Positive for arthralgias, back  pain and myalgias.     Vitals:    02/03/21 1252   BP: 116/81   Pulse: 94   Resp: 18   Temp: 98.6 °F (37 °C)   SpO2: 98%     Patient Active Problem List   Diagnosis   • Morbid obesity (CMS/HCC)   • BMI 45.0-49.9, adult (CMS/HCC)   • Pre-operative examination   • Dyspnea on exertion   • Preoperative cardiovascular examination   • Abnormal EKG   • Essential hypertension   • Abnormal nuclear stress test     Body mass index is 47.28 kg/m².    The following portions of the patient's history were reviewed and updated as appropriate: active problem list, medication list, allergies, social history, notes from last encounter    Physical Exam  Cardiovascular:      Rate and Rhythm: Normal rate and regular rhythm.   Pulmonary:      Effort: Pulmonary effort is normal.      Breath sounds: Normal breath sounds.   Abdominal:      General: Abdomen is flat. Bowel sounds are normal.   Neurological:      General: No focal deficit present.      Mental Status: He is alert and oriented to person, place, and time. Mental status is at baseline.   Psychiatric:         Mood and Affect: Mood normal.         Thought Content: Thought content normal.      Comments: Pt very anxious, has very little eye contact          Discussion/Plan:    New goals:  Avoid fried foods  Is going to start getting [employer]way 2 sandwiches a day  No frozen meals   Eating and drinking separately with 1 meals/ day  Work on cutting out sodas  Work on cutting out cannabis and cutting out alcohol- pt wants to quit smoking 30 days prior to surgery - wants to be given a quit date- also wants medications to help with anxiety when coming off cannabis         Obesity/Morbid Obesity: Currently the patient's weight is increased. There are no medications prescribed.Treatment plan includes prescribed diet, prescribed exercise regimen and behavior modification.    I reviewed the appropriate dietary choices with the patient and encouraged the necessary changes. Recommended at least 70  grams of protein per day, around 35 grams of fats and less than 100 grams of carbohydrates. Reviewed calorie intake if patient wanted to calorie count and/or had BMR. Instructed patient to drink half of body weight in ounces per day and exercise a minimum of 150 minutes per week including both cardio and strength training. Discussed the option of keeping a food journal which will help patient become more aware of the nutritional value of foods so they are more prepared after surgery.    The patient was given written materials from our office for education.   I answered all of the patients questions regarding dietary changes, exercise or surgical options.  The patient will follow up in 1 month. The total time spent face to face was 30 minutes with 20 minutes spent counseling.    I am going to try and get patient in with a new PCP for his medical care. Recently had a heart cath and has been cleared from cardiology for surgery. I am also going to discuss pt with Dr. Carver. Pt has a hx of extreme mood swings and outbursts of anger. Pt likely needs to start seeing a mental health counselor or psychiatrist before and after surgery. I would like to discuss with Dr. Carver further if pt is a good fit for surgery. Dr. Nickerson has seen pt already but needs to follow up with her in April before she will clear him due to PTSD hx, anxiety, and THC usage.     Scale given to pt today as he has limited income.     Nuzhat Barksdale APRCECELIA  [facility] Bariatrics and General Surgery

## 2021-02-17 DIAGNOSIS — R06.00 DYSPNEA, UNSPECIFIED TYPE: ICD-10-CM

## 2021-02-17 DIAGNOSIS — G47.33 OSA (OBSTRUCTIVE SLEEP APNEA): Primary | ICD-10-CM

## 2021-03-02 DIAGNOSIS — G47.33 OSA (OBSTRUCTIVE SLEEP APNEA): Primary | ICD-10-CM

## 2021-03-02 RX ORDER — TEMAZEPAM 30 MG/1
30 CAPSULE ORAL NIGHTLY PRN
Qty: 1 CAPSULE | Refills: 0 | Status: SHIPPED | OUTPATIENT
Start: 2021-03-02

## 2021-03-04 ENCOUNTER — OFFICE VISIT (OUTPATIENT)
Dept: BARIATRICS/WEIGHT MGMT | Facility: CLINIC | Age: 41
End: 2021-03-04

## 2021-03-04 DIAGNOSIS — E66.01 OBESITY, CLASS III, BMI 40-49.9 (MORBID OBESITY) (HCC): Primary | ICD-10-CM

## 2021-03-04 PROCEDURE — 99443 PR PHYS/QHP TELEPHONE EVALUATION 21-30 MIN: CPT | Performed by: NURSE PRACTITIONER

## 2021-03-04 NOTE — PROGRESS NOTES
[facility] BARIATRIC SURGERY  [address] [place] [address]  [address] [place] [address]  Dept: [phone]  3/4/2021      Manish Castillo.  [id number]  [id number]  1980  male    You have chosen to receive care through a telephone call . Do you consent to use a telephone call for your medical care today? Yes    Current weight: 338 pounds     The patient is here for month 5 of their pre-operative physician supervised diet. He had a loss of 10 lbs. The patient states that he is following the recommendations given by our office and dietician including a high lean protein, low carb and low fat diet. We recommended adequate fruits and vegetable intake along with limited portion sizes. Patient is working on eliminating fast foods, fried foods, sweets and soda. Manish Castillo has been increasing his daily water intake. He has been exercising: none due to back pain.    Patient states they have made positive changes including none  The patient admits to be struggling with exercise, smoking marijuana     Breakfast: eggs, pyle, cheese , or taco bell   Lunch: shrimp with cocktail sauce , hamburger with lettuce and tomatoes, taco bell, subway, ralleys, TV dinner  Dinner: similar to lunch- not much veggies   Snacks: popsicles , no sweets, cookies and cream ice cream PRN,   Drinks: jennifer beam and coke mixed together, skim milk, coffee 1/2 pot coffee in am, sugar free sprite   Exercise: low back pain, none recently some walking PRN     Past goals;  Avoid fried foods- partially met   Is going to start getting [employer]way 2 sandwiches a day  No frozen meals - not met  Eating and drinking separately with 1 meals/ day- partially met  Work on cutting out sodas- partially met   Work on cutting out cannabis and cutting out alcohol- not met    Review of Systems   Constitutional: Positive for activity change, appetite change and fatigue.   Respiratory: Negative.     Cardiovascular: Negative.    Gastrointestinal: Negative.    Musculoskeletal: Positive for back pain.     Height 72 inches, weight 338 pounds, BMI 45.84  Patient Active Problem List   Diagnosis   • Morbid obesity (CMS/HCC)   • BMI 45.0-49.9, adult (CMS/HCC)   • Pre-operative examination   • Dyspnea on exertion   • Preoperative cardiovascular examination   • Abnormal EKG   • Essential hypertension   • Abnormal nuclear stress test       The following portions of the patient's history were reviewed and updated as appropriate: active problem list, medication list, allergies, social history, notes from last encounter    Physical Exam  Cardiovascular:      Rate and Rhythm: Normal rate and regular rhythm.      Comments: Per patient  Pulmonary:      Effort: Pulmonary effort is normal.      Breath sounds: Normal breath sounds.      Comments: Per patient  Abdominal:      General: Abdomen is flat. Bowel sounds are normal.      Palpations: Abdomen is soft.      Comments: Per patient   Neurological:      General: No focal deficit present.      Mental Status: He is alert and oriented to person, place, and time.   Psychiatric:         Mood and Affect: Mood normal.         Behavior: Behavior normal.         Thought Content: Thought content normal.         Judgment: Judgment normal.     limited due to telephone call     Discussion/Plan:    Pt in need of a PCP- will refer pt to Dr. Barbosa's office as he is the closest [facility] PCP to pt. Pt would like medication for anxiety. Pt did ask me for ritalin today. I did explain to the patient this was not a medication I prescribe and need needs to follow up with his PCP or mental health provider for this.     New goals:  Avoid fried foods  No frozen meals   Eating and drinking separately with 1 meals/ day  Work on cutting out sodas  Work on cutting out cannabis and cutting out alcohol- needs to stop cannabis for 30 days pre surgery   Schedule apt with Dr. Barbosa's office     Obesity/Morbid  Obesity: Currently the patient's weight is decreased. There are no medications prescribed.Treatment plan includes prescribed diet, prescribed exercise regimen and behavior modification.    I reviewed the appropriate dietary choices with the patient and encouraged the necessary changes. Recommended at least 70 grams of protein per day, around 35 grams of fats and less than 100 grams of carbohydrates. Reviewed calorie intake if patient wanted to calorie count and/or had BMR. Instructed patient to drink half of body weight in ounces per day and exercise a minimum of 150 minutes per week including both cardio and strength training. Discussed the option of keeping a food journal which will help patient become more aware of the nutritional value of foods so they are more prepared after surgery.    The patient was given written materials from our office for education.   I answered all of the patients questions regarding dietary changes, exercise or surgical options.  The patient will follow up in 1 month. The total time spent face to face was 30 minutes with 20 minutes spent counseling.    Pt needs to see Cathryn for last SWL visit next month.     JOSUÉ Osei  [facility] bariatrics and General Surgery

## 2021-03-11 ENCOUNTER — HOSPITAL ENCOUNTER (OUTPATIENT)
Dept: SLEEP MEDICINE | Facility: HOSPITAL | Age: 41
Discharge: HOME | End: 2021-03-11
Admitting: INTERNAL MEDICINE

## 2021-03-11 VITALS — HEIGHT: 72 IN | WEIGHT: 315 LBS | BODY MASS INDEX: 42.66 KG/M2

## 2021-03-11 VITALS — WEIGHT: 315 LBS | BODY MASS INDEX: 42.66 KG/M2 | HEIGHT: 72 IN

## 2021-03-11 DIAGNOSIS — G47.33 OSA (OBSTRUCTIVE SLEEP APNEA): ICD-10-CM

## 2021-03-11 PROCEDURE — 95810 POLYSOM 6/> YRS 4/> PARAM: CPT

## 2021-03-12 DIAGNOSIS — G47.33 OSA (OBSTRUCTIVE SLEEP APNEA): Primary | ICD-10-CM

## 2021-03-22 ENCOUNTER — TELEPHONE (OUTPATIENT)
Dept: FAMILY MEDICINE CLINIC | Facility: CLINIC | Age: 41
End: 2021-03-22

## 2021-03-22 NOTE — TELEPHONE ENCOUNTER
Hub to read: Spoke with the patient last week in regards to a new patient appt. Patient was referred to Dr. Barbosa and Toyin Pino by his bariatric doctor because he needs a PCP. After speaking with the patient, he is wanting to be seen by a physician to help with cannabis withdrawal. Ronit, the  spoke with both Dr. Barbosa and Toyin in regards to taking this patient as a new patient. Dr. Barbosa states there is no medication that can be prescribed to help with withdrawal from cannabis and states the patient has to make a choice to either stop smoking it or not. Called the patient to let him know this information today and advised him that our providers do not feel like they could meet his needs and suggested finding a different PCP.

## 2021-03-26 RX ORDER — METOPROLOL SUCCINATE 25 MG/1
25 TABLET, EXTENDED RELEASE ORAL DAILY
Qty: 90 TABLET | Refills: 3 | OUTPATIENT
Start: 2021-03-26 | End: 2022-03-24 | Stop reason: SDUPTHER

## 2021-03-26 RX ORDER — HYDROCHLOROTHIAZIDE 25 MG/1
25 TABLET ORAL DAILY
Qty: 90 TABLET | Refills: 3 | OUTPATIENT
Start: 2021-03-26 | End: 2022-05-02 | Stop reason: SDUPTHER

## 2021-03-26 NOTE — TELEPHONE ENCOUNTER
Pt called LM asking for refills of Metoprolol and Hydrochlorothiazide.  Called pt back, his pharm is [facility] in [place].      Called in rx ,  Spoke to Frank.     Please sign.

## 2021-04-13 ENCOUNTER — PREP FOR SURGERY (OUTPATIENT)
Dept: OTHER | Facility: HOSPITAL | Age: 41
End: 2021-04-13

## 2021-04-13 DIAGNOSIS — E66.01 OBESITY, CLASS III, BMI 40-49.9 (MORBID OBESITY) (HCC): Primary | ICD-10-CM

## 2021-04-14 DIAGNOSIS — G47.33 OSA (OBSTRUCTIVE SLEEP APNEA): Primary | ICD-10-CM

## 2021-04-14 DIAGNOSIS — Z01.812 PRE-PROCEDURE LAB EXAM: ICD-10-CM

## 2021-04-16 ENCOUNTER — OFFICE VISIT (OUTPATIENT)
Dept: BARIATRICS/WEIGHT MGMT | Facility: CLINIC | Age: 41
End: 2021-04-16

## 2021-04-16 VITALS
WEIGHT: 315 LBS | SYSTOLIC BLOOD PRESSURE: 154 MMHG | DIASTOLIC BLOOD PRESSURE: 98 MMHG | BODY MASS INDEX: 47.47 KG/M2 | HEART RATE: 76 BPM

## 2021-04-16 PROCEDURE — 97802 MEDICAL NUTRITION INDIV IN: CPT | Performed by: DIETITIAN, REGISTERED

## 2021-04-16 NOTE — PROGRESS NOTES
The patient is here for supervised weight loss visit. Has partially met following goals: 1) pt still eating at fast food and Mexican food; 2.) not sure; 3.) pt still drinking with all meals; 4.) pt using more unsweet tea, skim milk, coffee and less sodas; 5.) pt states he is drinking etoh less often but still using cannabis.    PA: cut grass yesterday, walking to grocery takes 8 minutes one way 1-2 times a day     24 hr recall:  Breakfast: 12 pm pyle, eggs and cheese or Mexican food   Lunch: 4-5 pm shrimp, broiled with cocktail sauce, pizza with thin crust  Dinner: mexican food, shrimp, pizza  Snacks: milk in afternoon  Drinks: tea, coffee, milk     Review of Systems   /98    BP 76   Patient Active Problem List   Diagnosis   • Obesity         Vitals   Wt: 350 pounds     Weight change from last appointment:+12 lb     Weight change overall:+12 lb      Discussion/Plan:  Obesity/Morbid Obesity: I reviewed the appropriate dietary choices with the patient and encouraged the necessary changes. Recommended at least 60 grams of protein per day, around 33 grams of fats and less than 100 grams of carbohydrates. Reviewed calorie intake if patient wanted to calorie count and/or had BMR. Instructed patient to drink 64 ounces of water per day and exercise a minimum of 150 minutes per week including both cardio and strength training.  Discussed with pt also eating and drinking separately stopping 30 minutes before meals and 45 minutes after meals. Discussed the option of keeping a food journal which will help patient become more aware of the nutritional value of foods so they are more prepared after surgery.     I answered all of the patients questions regarding dietary changes, exercise or surgical options.     PES: Food and nutrition related knowledge deficit RT uncertainty how to apply nutrition information AEB needed education for guidelines for bariatric surgery.      The total time spent face to face was 15 minutes  with 15 minutes spent counseling.            Goals:  1.) Use more vegetables in turkey wraps and less pyle and gomez  2.) Walk to grocery and back 2-3 days per week.

## 2021-04-26 ENCOUNTER — LAB (OUTPATIENT)
Dept: LAB | Facility: HOSPITAL | Age: 41
End: 2021-04-26

## 2021-04-26 DIAGNOSIS — Z01.812 PRE-PROCEDURE LAB EXAM: ICD-10-CM

## 2021-04-26 DIAGNOSIS — G47.33 OSA (OBSTRUCTIVE SLEEP APNEA): ICD-10-CM

## 2021-04-26 LAB — SARS-COV-2 ORF1AB RESP QL NAA+PROBE: NOT DETECTED

## 2021-04-26 PROCEDURE — U0005 INFEC AGEN DETEC AMPLI PROBE: HCPCS

## 2021-04-26 PROCEDURE — C9803 HOPD COVID-19 SPEC COLLECT: HCPCS

## 2021-04-26 PROCEDURE — U0004 COV-19 TEST NON-CDC HGH THRU: HCPCS

## 2021-04-28 ENCOUNTER — TELEPHONE (OUTPATIENT)
Dept: BARIATRICS/WEIGHT MGMT | Facility: CLINIC | Age: 41
End: 2021-04-28

## 2021-04-28 ENCOUNTER — HOSPITAL ENCOUNTER (OUTPATIENT)
Dept: SLEEP MEDICINE | Facility: HOSPITAL | Age: 41
Discharge: HOME | End: 2021-04-28

## 2021-04-29 ENCOUNTER — TELEPHONE (OUTPATIENT)
Dept: BARIATRICS/WEIGHT MGMT | Facility: CLINIC | Age: 41
End: 2021-04-29

## 2021-04-29 DIAGNOSIS — F12.20 TETRAHYDROCANNABINOL (THC) DEPENDENCE (HCC): Primary | ICD-10-CM

## 2021-04-29 DIAGNOSIS — F41.9 ANXIETY: ICD-10-CM
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
## 2021-05-11 DIAGNOSIS — G47.33 OSA (OBSTRUCTIVE SLEEP APNEA): Primary | ICD-10-CM

## 2021-05-11 DIAGNOSIS — Z01.812 PRE-PROCEDURE LAB EXAM: ICD-10-CM

## 2021-05-17 ENCOUNTER — LAB (OUTPATIENT)
Dept: LAB | Facility: HOSPITAL | Age: 41
End: 2021-05-17

## 2021-05-17 DIAGNOSIS — Z01.812 PRE-PROCEDURE LAB EXAM: ICD-10-CM

## 2021-05-17 DIAGNOSIS — G47.33 OSA (OBSTRUCTIVE SLEEP APNEA): ICD-10-CM

## 2021-05-17 LAB — SARS-COV-2 ORF1AB RESP QL NAA+PROBE: NOT DETECTED

## 2021-05-17 PROCEDURE — C9803 HOPD COVID-19 SPEC COLLECT: HCPCS

## 2021-05-17 PROCEDURE — U0005 INFEC AGEN DETEC AMPLI PROBE: HCPCS

## 2021-05-17 PROCEDURE — U0004 COV-19 TEST NON-CDC HGH THRU: HCPCS

## 2021-05-19 ENCOUNTER — HOSPITAL ENCOUNTER (OUTPATIENT)
Dept: SLEEP MEDICINE | Facility: HOSPITAL | Age: 41
Discharge: HOME | End: 2021-05-19
Admitting: INTERNAL MEDICINE

## 2021-05-19 VITALS — HEIGHT: 72 IN | BODY MASS INDEX: 42.66 KG/M2 | WEIGHT: 315 LBS

## 2021-05-19 DIAGNOSIS — G47.33 OSA (OBSTRUCTIVE SLEEP APNEA): ICD-10-CM

## 2021-05-19 PROCEDURE — 95811 POLYSOM 6/>YRS CPAP 4/> PARM: CPT

## 2021-05-21 DIAGNOSIS — G47.33 OSA (OBSTRUCTIVE SLEEP APNEA): Primary | ICD-10-CM

## 2021-06-03 ENCOUNTER — TELEPHONE (OUTPATIENT)
Dept: CARDIOLOGY | Facility: CLINIC | Age: 41
End: 2021-06-03

## 2021-06-03 NOTE — TELEPHONE ENCOUNTER
DR. VELA  PHONE [phone]  [phone]  LAPAROSCOPIC GASTRIC SLEEVE   LAST OFFICE VISIT 12/21/2020

## 2021-06-09 ENCOUNTER — PREP FOR SURGERY (OUTPATIENT)
Dept: OTHER | Facility: HOSPITAL | Age: 41
End: 2021-06-09

## 2021-06-09 RX ORDER — SODIUM CHLORIDE 9 MG/ML
30 INJECTION, SOLUTION INTRAVENOUS CONTINUOUS PRN
Status: CANCELLED | OUTPATIENT
Start: 2021-06-09

## 2021-06-29 ENCOUNTER — LAB (OUTPATIENT)
Dept: LAB | Facility: HOSPITAL | Age: 41
End: 2021-06-29

## 2021-06-29 PROCEDURE — U0004 COV-19 TEST NON-CDC HGH THRU: HCPCS

## 2021-06-29 PROCEDURE — C9803 HOPD COVID-19 SPEC COLLECT: HCPCS

## 2021-06-29 PROCEDURE — U0005 INFEC AGEN DETEC AMPLI PROBE: HCPCS

## 2021-06-30 LAB — SARS-COV-2 ORF1AB RESP QL NAA+PROBE: NOT DETECTED

## 2021-07-01 ENCOUNTER — ANESTHESIA EVENT (OUTPATIENT)
Dept: GASTROENTEROLOGY | Facility: HOSPITAL | Age: 41
End: 2021-07-01

## 2021-07-01 ENCOUNTER — HOSPITAL ENCOUNTER (OUTPATIENT)
Facility: HOSPITAL | Age: 41
Setting detail: HOSPITAL OUTPATIENT SURGERY
Discharge: HOME | End: 2021-07-01
Attending: SURGERY | Admitting: SURGERY

## 2021-07-01 ENCOUNTER — ANESTHESIA (OUTPATIENT)
Dept: GASTROENTEROLOGY | Facility: HOSPITAL | Age: 41
End: 2021-07-01

## 2021-07-01 VITALS — OXYGEN SATURATION: 97 % | DIASTOLIC BLOOD PRESSURE: 99 MMHG | HEART RATE: 78 BPM | SYSTOLIC BLOOD PRESSURE: 139 MMHG

## 2021-07-01 VITALS
RESPIRATION RATE: 13 BRPM | SYSTOLIC BLOOD PRESSURE: 141 MMHG | BODY MASS INDEX: 39.17 KG/M2 | TEMPERATURE: 98.6 F | HEIGHT: 75 IN | OXYGEN SATURATION: 97 % | HEART RATE: 72 BPM | WEIGHT: 315 LBS | DIASTOLIC BLOOD PRESSURE: 84 MMHG

## 2021-07-01 PROCEDURE — 88305 TISSUE EXAM BY PATHOLOGIST: CPT | Performed by: SURGERY

## 2021-07-01 PROCEDURE — 43239 EGD BIOPSY SINGLE/MULTIPLE: CPT | Performed by: SURGERY

## 2021-07-01 PROCEDURE — 99214 OFFICE O/P EST MOD 30 MIN: CPT | Performed by: SURGERY

## 2021-07-01 PROCEDURE — 25010000002 PROPOFOL 10 MG/ML EMULSION: Performed by: NURSE ANESTHETIST, CERTIFIED REGISTERED

## 2021-07-01 RX ORDER — LIDOCAINE HYDROCHLORIDE 10 MG/ML
INJECTION, SOLUTION EPIDURAL; INFILTRATION; INTRACAUDAL; PERINEURAL AS NEEDED
Status: DISCONTINUED | OUTPATIENT
Start: 2021-07-01 | End: 2021-07-01 | Stop reason: SURG

## 2021-07-01 RX ORDER — PROPOFOL 10 MG/ML
VIAL (ML) INTRAVENOUS AS NEEDED
Status: DISCONTINUED | OUTPATIENT
Start: 2021-07-01 | End: 2021-07-01 | Stop reason: SURG

## 2021-07-01 RX ORDER — SODIUM CHLORIDE 9 MG/ML
INJECTION, SOLUTION INTRAVENOUS CONTINUOUS PRN
Status: DISCONTINUED | OUTPATIENT
Start: 2021-07-01 | End: 2021-07-01 | Stop reason: SURG

## 2021-07-01 RX ADMIN — SODIUM CHLORIDE: 0.9 INJECTION, SOLUTION INTRAVENOUS at 13:45

## 2021-07-01 RX ADMIN — LIDOCAINE HYDROCHLORIDE 50 MG: 10 INJECTION, SOLUTION EPIDURAL; INFILTRATION; INTRACAUDAL; PERINEURAL at 13:50

## 2021-07-01 RX ADMIN — PROPOFOL 200 MG: 10 INJECTION, EMULSION INTRAVENOUS at 13:50

## 2021-07-01 NOTE — OP NOTE
Surgeon:  Little Carver MD  Preoperative Diagnosis: Screening for bariatric surgery    Postoperative Diagnosis: normal    Procedure Performed: Esophagogastroduodenoscopy with biopsy of the antrum to check for H. pylori    Indications: The patient is interested in bariatric surgery for weight loss.  This is a screening EGD.      Specimen: biopsy of gastric antrum for H. Pylori    EBL: none    Procedure:     The procedure, indications, preparation and potential complications were explained to the patient, who indicated understanding and signed the corresponding consent forms.  The patient was identified, taken to the endoscopy suite, and placed on the left side down decubitus position.  The patient underwent a MAC anesthesia and was appropriately monitored through the case by the anesthesia personnel with continuous pulse oximetry, blood pressure, and cardiac monitoring.  A bite block was placed.  After adequate IV sedation and using a transoral technique a lubed flexible endoscope was placed in the hypopharynx and advanced to the second portion of the duodenum without difficulty. The scope was then withdrawn back into the antrum of the stomach.  Cold forcep biopsies of the antrum were taken to rule out Helicobacter pylori.  The scope was retroflexed noting the body, fundus and cardia.  The scope was then withdrawn back into the esophagus after decompressing the stomach.  The Z line was noted and GE junction measured from the incisors.  The scope was then completely withdrawn.  The patient tolerated the procedure well and left the endoscopy suite in stable condition.  The findings are listed below.        normal

## 2021-07-01 NOTE — ANESTHESIA POSTPROCEDURE EVALUATION
Patient: Manish Castillo    Procedure Summary     Date: 07/01/21 Room / Location: [facility] ENDOSCOPY 4 / [facility] ENDOSCOPY    Anesthesia Start: 1344 Anesthesia Stop: 1354    Procedure: ESOPHAGOGASTRODUODENOSCOPY with biopsy (N/A ) Diagnosis:       BMI 45.0-49.9, adult (CMS/[facility])      (BMI 45.0-49.9, adult (CMS/[facility]) [Z68.42])    Surgeons: Little Carver MD Provider: Ambrose Holden MD    Anesthesia Type: MAC ASA Status: 3          Anesthesia Type: MAC    Vitals  Vitals Value Taken Time   /84 07/01/21 1411   Temp     Pulse 72 07/01/21 1411   Resp 13 07/01/21 1411   SpO2 97 % 07/01/21 1411           Post Anesthesia Care and Evaluation    Patient location during evaluation: PACU  Patient participation: complete - patient participated  Level of consciousness: awake  Pain scale: See nurse's notes for pain score.  Pain management: adequate  Airway patency: patent  Anesthetic complications: No anesthetic complications  PONV Status: none  Cardiovascular status: acceptable  Respiratory status: acceptable  Hydration status: acceptable    Comments: Patient seen and examined postoperatively; vital signs stable; SpO2 greater than or equal to 90%; cardiopulmonary status stable; nausea/vomiting adequately controlled; pain adequately controlled; no apparent anesthesia complications; patient discharged from anesthesia care when discharge criteria were met

## 2021-07-01 NOTE — ANESTHESIA POSTPROCEDURE EVALUATION
Patient: Manish Castillo    Procedure Summary     Date: 07/01/21 Room / Location: [facility] ENDOSCOPY 4 / [facility] ENDOSCOPY    Anesthesia Start: 1344 Anesthesia Stop: 1354    Procedure: ESOPHAGOGASTRODUODENOSCOPY with biopsy (N/A ) Diagnosis:       BMI 45.0-49.9, adult (CMS/[facility])      (BMI 45.0-49.9, adult (CMS/[facility]) [Z68.42])    Surgeons: Little Carver MD Provider: Ambrose Holden MD    Anesthesia Type: MAC ASA Status: 3          Anesthesia Type: MAC    Vitals  Vitals Value Taken Time   /84 07/01/21 1411   Temp     Pulse 72 07/01/21 1411   Resp 13 07/01/21 1411   SpO2 97 % 07/01/21 1411           Post Anesthesia Care and Evaluation    Patient location during evaluation: PACU  Patient participation: complete - patient participated  Level of consciousness: awake  Pain scale: See nurse's notes for pain score.  Pain management: adequate  Airway patency: patent  Anesthetic complications: No anesthetic complications  PONV Status: none  Cardiovascular status: acceptable  Respiratory status: acceptable  Hydration status: acceptable    Comments: Patient seen and examined postoperatively; vital signs stable; SpO2 greater than or equal to 90%; cardiopulmonary status stable; nausea/vomiting adequately controlled; pain adequately controlled; no apparent anesthesia complications; patient discharged from anesthesia care when discharge criteria were met

## 2021-07-01 NOTE — ANESTHESIA PREPROCEDURE EVALUATION
Anesthesia Evaluation     Patient summary reviewed   NPO Solid Status: > 8 hours  NPO Liquid Status: > 8 hours           Airway   Mallampati: II  TM distance: >3 FB  Neck ROM: full  No difficulty expected  Dental - normal exam     Pulmonary - normal exam   (+) shortness of breath, sleep apnea on CPAP,   Cardiovascular - normal exam  Exercise tolerance: good (4-7 METS)    (+) hypertension,       Neuro/Psych  GI/Hepatic/Renal/Endo    (+) morbid obesity,      Musculoskeletal     Abdominal  - normal exam    Bowel sounds: normal.   Substance History      OB/GYN          Other                        Anesthesia Plan    ASA 3     MAC     intravenous induction     Anesthetic plan, all risks, benefits, and alternatives have been provided, discussed and informed consent has been obtained with: patient.
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
## 2021-07-02 LAB
LAB AP CASE REPORT: NORMAL
PATH REPORT.FINAL DX SPEC: NORMAL
PATH REPORT.GROSS SPEC: NORMAL

## 2021-07-14 ENCOUNTER — TELEPHONE (OUTPATIENT)
Dept: BARIATRICS/WEIGHT MGMT | Facility: CLINIC | Age: 41
End: 2021-07-14

## 2021-07-14 NOTE — TELEPHONE ENCOUNTER
Patient wanting updates/needs labs & screening completed.  Patient has not stopped smoking marijuana and said he was told he would be given a date to stop.  I explained that it can sometimes take months for THC to leave the body as it stores in fat cells.  Manish told me he would finish the marijuana he had purchased for the month and then would try to test later.  I explained that he could not be sent out to insurance without a clean (negative) drug screen.  Labs are in [facility] dated 11/2020.

## 2021-08-09 ENCOUNTER — TELEPHONE (OUTPATIENT)
Dept: BARIATRICS/WEIGHT MGMT | Facility: CLINIC | Age: 41
End: 2021-08-09

## 2021-08-09 NOTE — TELEPHONE ENCOUNTER
Drug screening inquiry.  Manish was concerned that because he has smoked marijuana everyday for 14 years that he would not be able to pass drug screen after 30 days.  He stated that he would wait 2-3 months and then retest.

## 2021-09-03 ENCOUNTER — TELEPHONE (OUTPATIENT)
Dept: BARIATRICS/WEIGHT MGMT | Facility: CLINIC | Age: 41
End: 2021-09-03

## 2021-09-03 NOTE — TELEPHONE ENCOUNTER
"Received a fax phone message from the answering service that Manish call to say \"he is having surgery in the upcoming months. Stopped using Cannibis 2 wks ago, has to test clean before can have surgery, just touching base, don't forget about me\"  I called Manish back after getting this message to let him know that he would not be forgotten about. He said that he would do a home test in a few weeks, once he gets a neg home test, he will go to [facility] and test. He really doesn't want to keep going back to the hospital. He will keep us posted and check in every month.   "

## 2021-09-23 PROCEDURE — U0004 COV-19 TEST NON-CDC HGH THRU: HCPCS | Performed by: FAMILY MEDICINE

## 2021-09-23 PROCEDURE — U0005 INFEC AGEN DETEC AMPLI PROBE: HCPCS | Performed by: FAMILY MEDICINE

## 2021-11-10 ENCOUNTER — TELEPHONE (OUTPATIENT)
Dept: BARIATRICS/WEIGHT MGMT | Facility: CLINIC | Age: 41
End: 2021-11-10

## 2021-11-10 NOTE — TELEPHONE ENCOUNTER
Manish called to just update us on where he is with stopping his cannabis. He stated he had been doing pretty good, but did have a relapse, he was going to talk to his PCP to see with they could give him for the withdraws, he has been been using cannabis daily for over 14 yrs due to pain. He will keep us updated so we can proceed with surgery.

## 2022-02-14 DIAGNOSIS — E66.01 MORBID OBESITY: ICD-10-CM

## 2022-02-14 DIAGNOSIS — R94.31 ABNORMAL EKG: ICD-10-CM

## 2022-02-14 DIAGNOSIS — I10 ESSENTIAL HYPERTENSION: Primary | ICD-10-CM

## 2022-02-14 DIAGNOSIS — R73.01 ELEVATED FASTING BLOOD SUGAR: ICD-10-CM

## 2022-02-21 ENCOUNTER — LAB (OUTPATIENT)
Dept: LAB | Facility: HOSPITAL | Age: 42
End: 2022-02-21

## 2022-02-21 LAB
ALBUMIN SERPL-MCNC: 5.2 G/DL (ref 3.5–5.2)
ALBUMIN/GLOB SERPL: 2.1 G/DL
ALP SERPL-CCNC: 61 U/L (ref 39–117)
ALT SERPL W P-5'-P-CCNC: 93 U/L (ref 1–41)
ANION GAP SERPL CALCULATED.3IONS-SCNC: 15.3 MMOL/L (ref 5–15)
AST SERPL-CCNC: 32 U/L (ref 1–40)
BILIRUB SERPL-MCNC: 0.3 MG/DL (ref 0–1.2)
BUN SERPL-MCNC: 25 MG/DL (ref 6–20)
BUN/CREAT SERPL: 21.9 (ref 7–25)
CALCIUM SPEC-SCNC: 10.5 MG/DL (ref 8.6–10.5)
CHLORIDE SERPL-SCNC: 97 MMOL/L (ref 98–107)
CHOLEST SERPL-MCNC: 228 MG/DL (ref 0–200)
CO2 SERPL-SCNC: 26.7 MMOL/L (ref 22–29)
CREAT SERPL-MCNC: 1.14 MG/DL (ref 0.76–1.27)
GFR SERPL CREATININE-BSD FRML MDRD: 72 ML/MIN/1.73
GLOBULIN UR ELPH-MCNC: 2.5 GM/DL
GLUCOSE SERPL-MCNC: 115 MG/DL (ref 65–99)
HBA1C MFR BLD: 5.8 % (ref 3.5–5.6)
HDLC SERPL-MCNC: 46 MG/DL (ref 40–60)
LDLC SERPL CALC-MCNC: 124 MG/DL (ref 0–100)
LDLC/HDLC SERPL: 2.53 {RATIO}
POTASSIUM SERPL-SCNC: 4.1 MMOL/L (ref 3.5–5.2)
PROT SERPL-MCNC: 7.7 G/DL (ref 6–8.5)
SODIUM SERPL-SCNC: 139 MMOL/L (ref 136–145)
TRIGL SERPL-MCNC: 327 MG/DL (ref 0–150)
VLDLC SERPL-MCNC: 58 MG/DL (ref 5–40)

## 2022-02-21 PROCEDURE — 80061 LIPID PANEL: CPT | Performed by: INTERNAL MEDICINE

## 2022-02-21 PROCEDURE — 80053 COMPREHEN METABOLIC PANEL: CPT | Performed by: INTERNAL MEDICINE

## 2022-02-21 PROCEDURE — 36415 COLL VENOUS BLD VENIPUNCTURE: CPT | Performed by: INTERNAL MEDICINE

## 2022-02-21 PROCEDURE — 83036 HEMOGLOBIN GLYCOSYLATED A1C: CPT | Performed by: INTERNAL MEDICINE

## 2022-03-02 ENCOUNTER — TELEPHONE (OUTPATIENT)
Dept: CARDIOLOGY | Facility: CLINIC | Age: 42
End: 2022-03-02

## 2022-03-02 NOTE — TELEPHONE ENCOUNTER
----- Message from Manish Castillo sent at 3/1/2022  5:18 PM EST -----  Regarding: labs  i havent heard from them today..but its no rush..im sure thell get back to me when they can..thnx for responding --MANISH

## 2022-03-24 ENCOUNTER — OFFICE VISIT (OUTPATIENT)
Dept: CARDIOLOGY | Facility: CLINIC | Age: 42
End: 2022-03-24

## 2022-03-24 VITALS
DIASTOLIC BLOOD PRESSURE: 89 MMHG | BODY MASS INDEX: 39.17 KG/M2 | HEIGHT: 75 IN | WEIGHT: 315 LBS | OXYGEN SATURATION: 98 % | HEART RATE: 100 BPM | SYSTOLIC BLOOD PRESSURE: 141 MMHG

## 2022-03-24 DIAGNOSIS — R79.89 ELEVATED LFTS: ICD-10-CM

## 2022-03-24 DIAGNOSIS — E66.01 MORBID OBESITY WITH BMI OF 40.0-44.9, ADULT: ICD-10-CM

## 2022-03-24 DIAGNOSIS — I25.10 CORONARY ARTERY DISEASE INVOLVING NATIVE CORONARY ARTERY OF NATIVE HEART WITHOUT ANGINA PECTORIS: ICD-10-CM

## 2022-03-24 DIAGNOSIS — I10 ESSENTIAL HYPERTENSION: Primary | ICD-10-CM

## 2022-03-24 DIAGNOSIS — R73.03 PREDIABETES: ICD-10-CM

## 2022-03-24 PROCEDURE — 99214 OFFICE O/P EST MOD 30 MIN: CPT | Performed by: INTERNAL MEDICINE

## 2022-03-24 PROCEDURE — 93000 ELECTROCARDIOGRAM COMPLETE: CPT | Performed by: INTERNAL MEDICINE

## 2022-03-24 RX ORDER — GAUZE BANDAGE 4" X 4"
BANDAGE TOPICAL
COMMUNITY

## 2022-03-24 RX ORDER — ATORVASTATIN CALCIUM 10 MG/1
10 TABLET, FILM COATED ORAL DAILY
Qty: 90 TABLET | Refills: 3 | Status: SHIPPED | OUTPATIENT
Start: 2022-03-24 | End: 2023-03-27

## 2022-03-24 RX ORDER — DIMENHYDRINATE 50 MG
TABLET ORAL
COMMUNITY

## 2022-03-24 RX ORDER — ERGOCALCIFEROL (VITAMIN D2) 10 MCG
400 TABLET ORAL DAILY
COMMUNITY

## 2022-03-24 RX ORDER — METOPROLOL SUCCINATE 25 MG/1
25 TABLET, EXTENDED RELEASE ORAL DAILY
Qty: 90 TABLET | Refills: 3 | OUTPATIENT
Start: 2022-03-24 | End: 2022-05-02

## 2022-03-24 RX ORDER — ASPIRIN 81 MG/1
81 TABLET ORAL DAILY
Qty: 100 TABLET | Refills: 3 | Status: SHIPPED | OUTPATIENT
Start: 2022-03-24
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
## 2022-05-02 ENCOUNTER — TELEPHONE (OUTPATIENT)
Dept: CARDIOLOGY | Facility: CLINIC | Age: 42
End: 2022-05-02

## 2022-05-02 RX ORDER — HYDROCHLOROTHIAZIDE 25 MG/1
25 TABLET ORAL DAILY
Qty: 90 TABLET | Refills: 3 | Status: SHIPPED | OUTPATIENT
Start: 2022-05-02

## 2022-05-02 RX ORDER — METOPROLOL SUCCINATE 25 MG/1
TABLET, EXTENDED RELEASE ORAL
Qty: 90 TABLET | Refills: 3 | Status: SHIPPED | OUTPATIENT
Start: 2022-05-02 | End: 2022-09-08 | Stop reason: SDUPTHER

## 2022-05-02 NOTE — TELEPHONE ENCOUNTER
Rx Refill Note  Requested Prescriptions     Pending Prescriptions Disp Refills   • metoprolol succinate XL (TOPROL-XL) 25 MG 24 hr tablet [Pharmacy Med Name: Metoprolol Succinate ER Oral Tablet Extended Release 24 Hour 25 MG] 90 tablet 3     Sig: TAKE 1 TABLET BY MOUTH EVERY DAY      Last office visit with prescribing clinician: 3/24/2022      Next office visit with prescribing clinician: 5/2/2022            Carleen Ibanez MA  05/02/22, 09:33 EDT

## 2022-05-02 NOTE — TELEPHONE ENCOUNTER
Rx Refill Note  Requested Prescriptions     Pending Prescriptions Disp Refills   • hydroCHLOROthiazide (HYDRODIURIL) 25 MG tablet 90 tablet 3     Sig: Take 1 tablet by mouth Daily.      Last office visit with prescribing clinician: 3/24/2022      Next office visit with prescribing clinician: 3/27/2023            Carleen Ibanez MA  05/02/22, 09:53 EDT

## 2022-05-02 NOTE — TELEPHONE ENCOUNTER
Incoming Refill Request      Medication requested (name and dose): HYDROCHLOROTHIAZIDE 25 MG    Pharmacy where request should be sent: [facility] [place]    Additional details provided by patient:    Best call back number: [phone]    Does the patient have less than a 3 day supply:  [] Yes  [x] No    Surekha Jaimes Rep  05/02/22, 09:24 EDT

## 2022-08-11 ENCOUNTER — TELEPHONE (OUTPATIENT)
Dept: BARIATRICS/WEIGHT MGMT | Facility: CLINIC | Age: 42
End: 2022-08-11

## 2022-08-11 NOTE — TELEPHONE ENCOUNTER
Returning Manish call. He wanted to let us know that he still using cannabis, but is working hard on stopping. He has gone several months with out it, but then relapsed. He will call us back in a few months to update us again. He stated he is also taking a more vitamins

## 2022-09-08 RX ORDER — METOPROLOL SUCCINATE 25 MG/1
25 TABLET, EXTENDED RELEASE ORAL DAILY
Qty: 30 TABLET | Refills: 0 | Status: SHIPPED | OUTPATIENT
Start: 2022-09-08

## 2022-09-08 NOTE — TELEPHONE ENCOUNTER
Rx Refill Note  Requested Prescriptions     Pending Prescriptions Disp Refills   • metoprolol succinate XL (TOPROL-XL) 25 MG 24 hr tablet 30 tablet 0     Sig: Take 1 tablet by mouth Daily.      Last office visit with prescribing clinician: 3/24/2022      Next office visit with prescribing clinician: 3/27/2023            Carleen Ibanez MA  09/08/22, 11:18 EDT

## 2022-09-08 NOTE — TELEPHONE ENCOUNTER
Incoming Refill Request      Medication requested (name and dose): METOPROLOL 25 MG, 30    Pharmacy where request should be sent: [facility]     Additional details provided by patient: [facility],[place] OF [place]OWN NEEDS THIS TO HOLD HIM OVER TILL HE GETS HOME     Best call back number: [phone]    Does the patient have less than a 3 day supply:  [x] Yes  [] No    Surekha Andres Rep  09/08/22, 09:42 EDT

## 2023-03-07 ENCOUNTER — TELEPHONE (OUTPATIENT)
Dept: BARIATRICS/WEIGHT MGMT | Facility: CLINIC | Age: 43
End: 2023-03-07
Payer: MEDICARE

## 2023-03-07 NOTE — TELEPHONE ENCOUNTER
Manish called to let us know that he is still struggling to stop cannabis use and will let us know when he is ready to take his UDS. [place]

## 2023-03-20 ENCOUNTER — LAB (OUTPATIENT)
Dept: LAB | Facility: HOSPITAL | Age: 43
End: 2023-03-20
Payer: MEDICARE

## 2023-03-20 DIAGNOSIS — I25.10 CORONARY ARTERY DISEASE INVOLVING NATIVE CORONARY ARTERY OF NATIVE HEART WITHOUT ANGINA PECTORIS: ICD-10-CM

## 2023-03-20 DIAGNOSIS — E66.01 MORBID OBESITY WITH BMI OF 40.0-44.9, ADULT: ICD-10-CM

## 2023-03-20 DIAGNOSIS — R73.03 PREDIABETES: ICD-10-CM

## 2023-03-20 DIAGNOSIS — I10 ESSENTIAL HYPERTENSION: ICD-10-CM

## 2023-03-20 DIAGNOSIS — R79.89 ELEVATED LFTS: ICD-10-CM

## 2023-03-20 LAB
ALBUMIN SERPL-MCNC: 3.9 G/DL (ref 3.5–5.2)
ALBUMIN/GLOB SERPL: 1.2 G/DL
ALP SERPL-CCNC: 96 U/L (ref 39–117)
ALT SERPL W P-5'-P-CCNC: 45 U/L (ref 1–41)
ANION GAP SERPL CALCULATED.3IONS-SCNC: 12.8 MMOL/L (ref 5–15)
AST SERPL-CCNC: 25 U/L (ref 1–40)
BILIRUB SERPL-MCNC: 0.4 MG/DL (ref 0–1.2)
BUN SERPL-MCNC: 11 MG/DL (ref 6–20)
BUN/CREAT SERPL: 10.5 (ref 7–25)
CALCIUM SPEC-SCNC: 9.8 MG/DL (ref 8.6–10.5)
CHLORIDE SERPL-SCNC: 103 MMOL/L (ref 98–107)
CHOLEST SERPL-MCNC: 166 MG/DL (ref 0–200)
CO2 SERPL-SCNC: 26.2 MMOL/L (ref 22–29)
CREAT SERPL-MCNC: 1.05 MG/DL (ref 0.76–1.27)
EGFRCR SERPLBLD CKD-EPI 2021: 92 ML/MIN/1.73
GLOBULIN UR ELPH-MCNC: 3.3 GM/DL
GLUCOSE SERPL-MCNC: 166 MG/DL (ref 65–99)
HDLC SERPL-MCNC: 33 MG/DL (ref 40–60)
LDLC SERPL CALC-MCNC: 92 MG/DL (ref 0–100)
LDLC/HDLC SERPL: 2.58 {RATIO}
POTASSIUM SERPL-SCNC: 4.4 MMOL/L (ref 3.5–5.2)
PROT SERPL-MCNC: 7.2 G/DL (ref 6–8.5)
SODIUM SERPL-SCNC: 142 MMOL/L (ref 136–145)
TRIGL SERPL-MCNC: 239 MG/DL (ref 0–150)
VLDLC SERPL-MCNC: 41 MG/DL (ref 5–40)

## 2023-03-20 PROCEDURE — 80053 COMPREHEN METABOLIC PANEL: CPT

## 2023-03-20 PROCEDURE — 80061 LIPID PANEL: CPT

## 2023-03-20 PROCEDURE — 36415 COLL VENOUS BLD VENIPUNCTURE: CPT

## 2023-03-27 ENCOUNTER — OFFICE VISIT (OUTPATIENT)
Dept: CARDIOLOGY | Facility: CLINIC | Age: 43
End: 2023-03-27
Payer: MEDICARE

## 2023-03-27 VITALS
HEART RATE: 69 BPM | HEIGHT: 72 IN | DIASTOLIC BLOOD PRESSURE: 98 MMHG | SYSTOLIC BLOOD PRESSURE: 128 MMHG | BODY MASS INDEX: 42.66 KG/M2 | WEIGHT: 315 LBS

## 2023-03-27 DIAGNOSIS — R73.03 PREDIABETES: ICD-10-CM

## 2023-03-27 DIAGNOSIS — R79.89 ELEVATED LFTS: ICD-10-CM

## 2023-03-27 DIAGNOSIS — E66.01 MORBID OBESITY WITH BMI OF 40.0-44.9, ADULT: ICD-10-CM

## 2023-03-27 DIAGNOSIS — I25.10 CORONARY ARTERY DISEASE INVOLVING NATIVE CORONARY ARTERY OF NATIVE HEART WITHOUT ANGINA PECTORIS: ICD-10-CM

## 2023-03-27 DIAGNOSIS — I10 ESSENTIAL HYPERTENSION: Primary | ICD-10-CM

## 2023-03-27 PROCEDURE — 3074F SYST BP LT 130 MM HG: CPT | Performed by: INTERNAL MEDICINE

## 2023-03-27 PROCEDURE — 1160F RVW MEDS BY RX/DR IN RCRD: CPT | Performed by: INTERNAL MEDICINE

## 2023-03-27 PROCEDURE — 99214 OFFICE O/P EST MOD 30 MIN: CPT | Performed by: INTERNAL MEDICINE

## 2023-03-27 PROCEDURE — 1159F MED LIST DOCD IN RCRD: CPT | Performed by: INTERNAL MEDICINE

## 2023-03-27 PROCEDURE — 3080F DIAST BP >= 90 MM HG: CPT | Performed by: INTERNAL MEDICINE

## 2023-03-27 PROCEDURE — 93000 ELECTROCARDIOGRAM COMPLETE: CPT | Performed by: INTERNAL MEDICINE

## 2023-03-27 RX ORDER — LANOLIN ALCOHOL/MO/W.PET/CERES
1000 CREAM (GRAM) TOPICAL DAILY
COMMUNITY

## 2023-03-27 NOTE — PROGRESS NOTES
Subjective:     Encounter Date:03/27/2023      Patient ID: Manish Castillo is a 42 y.o. male.    Chief Complaint and history of present illness:     Follow-up for hypertension CAD obesity BMI over 40    History of Present Illness  :    Mr. Manish Castillo has PMH of    -CAD,  cath 1/27/2021 tortuous RCA with a kink in PLV causing 50% narrowing  -Hypertension  -Morbid obesity  -On disability due to psych disorder PTSD and panic attacks and THC  -Positive family history of CAD in mother     Here for follow-up.  Patient was seen for bariatric surgery evaluation.  Patient relapsed on his cannabis and has not had his bariatric surgery yet.  Is planning on going to [facility].  Denies any chest pain or shortness of breath.    Patient's arterial blood pressure is 144/99, repeat was 128/88, heart rate 69 bpm.  BMI is over 45.    Patient underwent stress test 1/7/2021 which revealed large inferior ischemia.  The cath 1/27/2021 revealed kinking RCA causing 50% narrowing    Labs from 2/21/2022 reveal a with a ALT 93, AST 32, glucose 115, hemoglobin A1c 5.8.  Profile with cholesterol 228, triglycerides 327, HDL 46, .  Labs from 3/20/2023 reveal lipid profile with cholesterol 166, triglycerides 239, HDL low at 33, LDL 92.  CMP with glucose of 166, ALT 45        Assessment :     CAD  Hypertension  Morbid obesity with BMI over 45  Dyslipidemia  Positive family history of heart disease  Hyperglycemia, prediabetes  Elevated LFTs     Recommendations and plan :     Reviewed EKG and lab results with patient.  Patient is refusing statins because he had family members having kidney disease with statins.  Advised patient to check blood pressure at home.  Reviewed elevated LFTs and prediabetes with patient.  Reviewed BMI over 45, counseled on weight loss diet and exercise  Counseled on smoking and marijuana cessation.  We will follow-up and check lipid profile and CMP and A1c before next visit.  Advised patient to follow-up  with PMD for elevated LFTs and prediabetes             ECG 12 Lead    Date/Time: 3/27/2023 1:48 PM  Performed by: Darren Nice MD  Authorized by: Darren Nice MD   Comparison: compared with previous ECG from 3/24/2022  Comparison to previous ECG: EKG done today reviewed/interpreted by me reveals sinus rhythm with rate of 69 bpm, no new change compared EKG from 3/24/2022              Copied text in this portion of the note has been reviewed and is accurate as of 3/27/2023  The following portions of the patient's history were reviewed and updated as appropriate: allergies, current medications, past family history, past medical history, past social history, past surgical history and problem list.    Assessment:         MDM     Diagnosis Plan   1. Essential hypertension  Comprehensive Metabolic Panel    Lipid Panel    Hemoglobin A1c      2. Coronary artery disease involving native coronary artery of native heart without angina pectoris  Comprehensive Metabolic Panel    Lipid Panel    Hemoglobin A1c      3. Morbid obesity with BMI of 40.0-44.9, adult (HCC)  Comprehensive Metabolic Panel    Lipid Panel    Hemoglobin A1c      4. Prediabetes  Comprehensive Metabolic Panel    Lipid Panel    Hemoglobin A1c      5. Elevated LFTs  Comprehensive Metabolic Panel    Lipid Panel    Hemoglobin A1c             Plan:               Past Medical History:  Past Medical History:   Diagnosis Date   • Episodic mood disorder (HCC)    • Hypertension    • Panic attacks    • PTSD (post-traumatic stress disorder)    • Sleep apnea     NO MASK      Past Surgical History:  Past Surgical History:   Procedure Laterality Date   • CARDIAC CATHETERIZATION N/A 1/27/2021    Procedure: Left Heart Cath;  Surgeon: Darren Nice MD;  Location: [facility] INVASIVE LOCATION;  Service: Cardiovascular;  Laterality: N/A;   • ENDOSCOPY N/A 7/1/2021    Procedure: ESOPHAGOGASTRODUODENOSCOPY with biopsy;  Surgeon: Little Carver,  MD;  Location: [facility] ENDOSCOPY;  Service: General;  Laterality: N/A;  post : normal anatomy with biopsy   • MOUTH SURGERY     • TONSILLECTOMY     • WRIST SURGERY      lead from pencil removed from wrist      Allergies:  Allergies   Allergen Reactions   • Aspirin Other (See Comments)     Body discomfort     Home Meds:  Current Meds:     Current Outpatient Medications:   •  aspirin (aspirin) 81 MG EC tablet, Take 1 tablet by mouth Daily., Disp: 100 tablet, Rfl: 3  •  Flaxseed, Linseed, (Flax Seed Oil) 1000 MG capsule, Take  by mouth., Disp: , Rfl:   •  hydroCHLOROthiazide (HYDRODIURIL) 25 MG tablet, Take 1 tablet by mouth Daily., Disp: 90 tablet, Rfl: 3  •  metoprolol succinate XL (TOPROL-XL) 25 MG 24 hr tablet, Take 1 tablet by mouth Daily., Disp: 30 tablet, Rfl: 0  •  Misc Natural Products (FIBER 7 PO), Take  by mouth., Disp: , Rfl:   •  Multiple Vitamins-Minerals (MULTI ADULT GUMMIES PO), Take  by mouth., Disp: , Rfl:   •  Omega-3 Fatty Acids (Fish Oil) 435 MG capsule, Take  by mouth., Disp: , Rfl:   •  vitamin B-12 (CYANOCOBALAMIN) 1000 MCG tablet, Take 1 tablet by mouth Daily., Disp: , Rfl:   •  Vitamin D, Cholecalciferol, (CHOLECALCIFEROL) 10 MCG (400 UNIT) tablet, Take 1 tablet by mouth Daily., Disp: , Rfl:   •  temazepam (RESTORIL) 30 MG capsule, Take 1 capsule by mouth At Night As Needed for Sleep. One dose for sleep study to be done at the hospital (Patient not taking: Reported on 3/27/2023), Disp: 1 capsule, Rfl: 0  Social History:   Social History     Tobacco Use   • Smoking status: Former     Types: Cigarettes     Quit date:      Years since quittin.2   • Smokeless tobacco: Never   Substance Use Topics   • Alcohol use: Yes     Comment: occassional salas gonzales       Family History:  Family History   Problem Relation Age of Onset   • Heart disease Mother    • Hypertension Mother               Review of Systems   Constitutional: Negative for malaise/fatigue.   Cardiovascular: Negative  "for chest pain, leg swelling and palpitations.   Respiratory: Negative for shortness of breath.    Skin: Negative for rash.   Neurological: Negative for dizziness, light-headedness and numbness.     All other systems are negative         Objective:     Physical Exam  /98   Pulse 69   Ht 183.5 cm (72.25\")   Wt (!) 158 kg (348 lb)   BMI 46.87 kg/m²   General:  Appears in no acute distress  Eyes: Sclera is anicteric,  conjunctiva is clear   HEENT:  No JVD.  No carotid bruits  Respiratory: Respirations regular and unlabored at rest.  Clear to auscultation  Cardiovascular: S1,S2 Regular rate and rhythm. No murmur, rub or gallop auscultated.   Extremities: No digital clubbing or cyanosis, no edema  Skin: Color pink. Skin warm and dry to touch. No rashes  No xanthoma  Neuro: Alert and awake.    Lab Reviewed:         Darren Nice MD  3/27/2023 13:56 EDT      EMR Dragon/Transcription:   \"Dictated utilizing Dragon dictation\".        "

## 2023-05-30 RX ORDER — METOPROLOL SUCCINATE 25 MG/1
TABLET, EXTENDED RELEASE ORAL
Qty: 90 TABLET | Refills: 3 | Status: SHIPPED | OUTPATIENT
Start: 2023-05-30

## 2023-05-30 RX ORDER — HYDROCHLOROTHIAZIDE 25 MG/1
TABLET ORAL
Qty: 90 TABLET | Refills: 3 | Status: SHIPPED | OUTPATIENT
Start: 2023-05-30

## 2023-05-30 NOTE — TELEPHONE ENCOUNTER
Rx Refill Note  Requested Prescriptions     Pending Prescriptions Disp Refills   • metoprolol succinate XL (TOPROL-XL) 25 MG 24 hr tablet [Pharmacy Med Name: Metoprolol Succinate ER Oral Tablet Extended Release 24 Hour 25 MG] 90 tablet 0     Sig: TAKE 1 TABLET BY MOUTH EVERY DAY   • hydroCHLOROthiazide (HYDRODIURIL) 25 MG tablet [Pharmacy Med Name: hydroCHLOROthiazide Oral Tablet 25 MG] 90 tablet 0     Sig: TAKE 1 TABLET BY MOUTH EVERY DAY      Last office visit with prescribing clinician: 3/27/2023   Last telemedicine visit with prescribing clinician: Visit date not found   Next office visit with prescribing clinician: 3/28/2024                         Would you like a call back once the refill request has been completed: [] Yes [] No    If the office needs to give you a call back, can they leave a voicemail: [] Yes [] No    Mckenna Castellon MA  05/30/23, 13:33 EDT

## 2024-01-23 ENCOUNTER — TELEPHONE (OUTPATIENT)
Dept: BARIATRICS/WEIGHT MGMT | Facility: CLINIC | Age: 44
End: 2024-01-23
Payer: MEDICARE

## 2024-01-23 NOTE — TELEPHONE ENCOUNTER
Called pt to review new patient packet & bariatric benefits. LMOVM to return call to the office.

## 2024-01-31 ENCOUNTER — TELEPHONE (OUTPATIENT)
Dept: BARIATRICS/WEIGHT MGMT | Facility: CLINIC | Age: 44
End: 2024-01-31
Payer: MEDICARE

## 2024-02-19 NOTE — PROGRESS NOTES
Nutrition Services    Patient Name: Manish Castillo  YOB: 1980  MRN: [id number]  Date of Service: 02/20/24      ICD-10-CM ICD-9-CM   1. Obesity, Class III, BMI 40-49.9 (morbid obesity)  E66.01 278.01   2. Abnormal findings on diagnostic imaging of other specified body structures  R93.89 793.99   3. Abnormal finding of blood chemistry, unspecified  R79.9 790.6   4. Body mass index (BMI) 40.0-44.9, adult  Z68.41 V85.41   5. Myalgia, unspecified site  M79.10 729.1   6. Arthralgia, unspecified joint  M25.50 719.40   7. Congestive heart failure, unspecified HF chronicity, unspecified heart failure type  I50.9 428.0   8. Pre-operative clearance  Z01.818 V72.84        RD Recommendation        Candidacy for surgery? Good   RD Comments Recommend patient for surgery with adherence to nutrition recommendations    Procedure Patient is pursuing VSG     NUTRITION ASSESSMENT - BARIATRIC SURGERY      Reason for Visit RDN eval for surgery, SWL 1/4     H&P      Past Medical History:   Diagnosis Date    CHF (congestive heart failure)     Constipation     Depression     Episodic mood disorder     Exercise-induced leg cramps     Hair loss     Heartburn     History of psychiatric hospitalization     Hypertension     Panic attacks     Pre-diabetes     PTSD (post-traumatic stress disorder)     Shortness of breath on exertion     Sleep apnea     NO MASK     Substance abuse     past and present per packet    Tired     Weight gain        Past Surgical History:   Procedure Laterality Date    CARDIAC CATHETERIZATION N/A 1/27/2021    Procedure: Left Heart Cath;  Surgeon: Darren Nice MD;  Location: [facility] CATH INVASIVE LOCATION;  Service: Cardiovascular;  Laterality: N/A;    ENDOSCOPY N/A 7/1/2021    Procedure: ESOPHAGOGASTRODUODENOSCOPY with biopsy;  Surgeon: Little Carver MD;  Location: [facility] ENDOSCOPY;  Service: General;  Laterality: N/A;  post : normal anatomy with biopsy    MOUTH SURGERY      TONSILLECTOMY   "1993    WRIST SURGERY      lead from pencil removed from wrist        Previous Goals          Encounter Information        Visit Narrative     Patient has been trying to stay away from fried foods recently and has added in sandwiches to help. Patient has quit smoking cannabis but reports he added in alcohol in its place. Patient reports he is a quick eater and is going to work on slowing down.     Diet Recall:   Breakfast: pyle, eggs  Lunch: sandwiches  Dinner: mexican food  Snacks: chips & salsa, cheese  Beverages: beer (1x/week), skim milk, prune juice    Exercise: patient currently does not exercise. Patient reports swimming is easiest currently and is going to try to add in.     Supplements: MV, flax seed, salmon oil, plant sterols, fiber powder. Patient reports he is going to add in apple cider vinegar to flush out THC    Self Monitoring:          Anthropometrics        Current Height, Weight Height: 185.4 cm (73\")  Weight: (!) 158 kg (348 lb 4.8 oz) (02/20/24 0928)            Wt Readings from Last 30 Encounters:   02/20/24 0928 (!) 158 kg (348 lb 4.8 oz)   03/27/23 1335 (!) 158 kg (348 lb)   03/24/22 1317 (!) 163 kg (360 lb)   07/01/21 1328 (!) 159 kg (350 lb 8.5 oz)   06/25/21 1547 (!) 158 kg (348 lb)   05/19/21 2154 (!) 159 kg (350 lb)   04/16/21 0835 (!) 159 kg (350 lb)   03/11/21 2008 (!) 153 kg (338 lb)   03/11/21 1623 (!) 153 kg (338 lb)   02/03/21 1252 (!) 158 kg (348 lb 9.6 oz)   01/27/21 0828 (!) 157 kg (347 lb 3.6 oz)   12/21/20 1421 (!) 156 kg (345 lb)   12/01/20 1427 (!) 154 kg (339 lb)   11/04/20 1334 (!) 153 kg (338 lb)      BMI kg/m2 Body mass index is 45.95 kg/m².       Nutrition Diagnosis         Nutrition Dx Statement Overweight/obesity RT multifactorial biochemical, behavioral and environmental contributors to disease AEB BMI 45.95 kg/m^2         Nutrition Intervention         Nutrition Intervention Nutrition education related to diet modification and physical activity    "     Monitor/Evaluation        New Goals Patient to eat and drink separately with 1 meal  Patient to chew food well and make meals last 20-30 minutes  Patient to aim for 48-64 oz each day       Total time spent with pt 15 minutes of which 15 minutes were spent on education.       Electronically signed by:  Casey Johnston RD  02/20/24 11:20 EST

## 2024-02-20 ENCOUNTER — CONSULT (OUTPATIENT)
Dept: BARIATRICS/WEIGHT MGMT | Facility: CLINIC | Age: 44
End: 2024-02-20
Payer: MEDICARE

## 2024-02-20 VITALS
HEART RATE: 103 BPM | SYSTOLIC BLOOD PRESSURE: 140 MMHG | HEIGHT: 73 IN | OXYGEN SATURATION: 98 % | WEIGHT: 315 LBS | BODY MASS INDEX: 41.75 KG/M2 | DIASTOLIC BLOOD PRESSURE: 94 MMHG

## 2024-02-20 DIAGNOSIS — R93.89 ABNORMAL FINDINGS ON DIAGNOSTIC IMAGING OF OTHER SPECIFIED BODY STRUCTURES: ICD-10-CM

## 2024-02-20 DIAGNOSIS — R79.9 ABNORMAL FINDING OF BLOOD CHEMISTRY, UNSPECIFIED: ICD-10-CM

## 2024-02-20 DIAGNOSIS — Z01.818 PRE-OPERATIVE CLEARANCE: ICD-10-CM

## 2024-02-20 DIAGNOSIS — M25.50 ARTHRALGIA, UNSPECIFIED JOINT: ICD-10-CM

## 2024-02-20 DIAGNOSIS — M79.10 MYALGIA, UNSPECIFIED SITE: ICD-10-CM

## 2024-02-20 DIAGNOSIS — E66.01 OBESITY, CLASS III, BMI 40-49.9 (MORBID OBESITY): Primary | ICD-10-CM

## 2024-02-20 DIAGNOSIS — I50.9 CONGESTIVE HEART FAILURE, UNSPECIFIED HF CHRONICITY, UNSPECIFIED HEART FAILURE TYPE: ICD-10-CM
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
## 2024-02-23 ENCOUNTER — PATIENT ROUNDING (BHMG ONLY) (OUTPATIENT)
Dept: BARIATRICS/WEIGHT MGMT | Facility: CLINIC | Age: 44
End: 2024-02-23
Payer: MEDICARE

## 2024-03-15 ENCOUNTER — TELEMEDICINE (OUTPATIENT)
Dept: BARIATRICS/WEIGHT MGMT | Facility: CLINIC | Age: 44
End: 2024-03-15
Payer: MEDICARE

## 2024-03-15 DIAGNOSIS — E66.01 OBESITY, CLASS III, BMI 40-49.9 (MORBID OBESITY): Primary | ICD-10-CM

## 2024-03-15 DIAGNOSIS — Z71.3 NUTRITIONAL COUNSELING: ICD-10-CM

## 2024-03-15 NOTE — PROGRESS NOTES
MGK BAR SURG [facility] MEDICAL GROUP BARIATRIC SURGERY  [address] [place] [address]  [address] [place] [address]  Dept: [phone]  3/15/2024      Manish Castillo.  [id number]  [id number]  1980  male    You have chosen to receive care through a video visit. Do you consent to use a video visit for your medical care today? Yes    Swl #2    Current weight: 348 pounds     The patient is here for month 2 of their pre-operative physician supervised diet. He had a loss of 0 lbs. The patient states that he is following the recommendations given by our office and dietician including a high lean protein, low carb and low fat diet. We recommended adequate fruits and vegetable intake along with limited portion sizes. Patient is working on eliminating fast foods, fried foods, sweets and soda. Manish Castillo has been increasing his daily water intake. He has been exercising: walking some but having a lot of back pain.  Wt Readings from Last 10 Encounters:   02/20/24 (!) 158 kg (348 lb 4.8 oz)   03/27/23 (!) 158 kg (348 lb)   03/24/22 (!) 163 kg (360 lb)   07/01/21 (!) 159 kg (350 lb 8.5 oz)   05/19/21 (!) 159 kg (350 lb)   04/16/21 (!) 159 kg (350 lb)   03/11/21 (!) 153 kg (338 lb)   03/11/21 (!) 153 kg (338 lb)   02/03/21 (!) 158 kg (348 lb 9.6 oz)   01/27/21 (!) 157 kg (347 lb 3.6 oz)     Patient states they have made positive changes including getting an apt with [facility]  The patient admits to be struggling with cutting out THC and beer    B- pyle, egg, cheese, biscuit and gravy   L-chips and salsa   D-cold cut sandwich   S-premier protein shake   Drinks: protein shake , cutting down on beer , skim milk, water     Exercise: limited: pain in back   [facility] for psych     Trying to cut out fried foods     Current weight 348 pounds     Review of Systems   Constitutional:  Positive for activity change, appetite change and fatigue.    Respiratory: Negative.     Cardiovascular: Negative.    Gastrointestinal: Negative.    Musculoskeletal:  Positive for back pain and myalgias.   Psychiatric/Behavioral:          Anxiety              The following portions of the patient's history were reviewed and updated as appropriate: active problem list, medication list, allergies, social history, notes from last encounter  Past Medical History:   Diagnosis Date    CHF (congestive heart failure)     Constipation     Depression     Episodic mood disorder     Exercise-induced leg cramps     Hair loss     Heartburn     History of psychiatric hospitalization     Hypertension     Panic attacks     Pre-diabetes     PTSD (post-traumatic stress disorder)     Shortness of breath on exertion     Sleep apnea     NO MASK     Substance abuse     past and present per packet    Tired     Weight gain      Past Surgical History:   Procedure Laterality Date    CARDIAC CATHETERIZATION N/A 1/27/2021    Procedure: Left Heart Cath;  Surgeon: Darren Nice MD;  Location: [facility] CATH INVASIVE LOCATION;  Service: Cardiovascular;  Laterality: N/A;    ENDOSCOPY N/A 7/1/2021    Procedure: ESOPHAGOGASTRODUODENOSCOPY with biopsy;  Surgeon: Little Carver MD;  Location: [facility] ENDOSCOPY;  Service: General;  Laterality: N/A;  post : normal anatomy with biopsy    MOUTH SURGERY      TONSILLECTOMY  1993    WRIST SURGERY      lead from pencil removed from wrist        Physical Exam  Constitutional:       Appearance: Normal appearance. He is obese.   Cardiovascular:      Comments: Unable to assess due to video visit  Pulmonary:      Comments: Unable to assess due to video visit  Abdominal:      Comments: Unable to assess due to video visit   Neurological:      General: No focal deficit present.      Mental Status: He is alert and oriented to person, place, and time.   Psychiatric:         Mood and Affect: Mood normal.         Behavior: Behavior normal.         Thought Content: Thought  content normal.         Judgment: Judgment normal.         Discussion/Plan:    New goals:  Continue with psychiatric visits with [facility]- speak with them about concerns of anxiety post op  Look into finding a new PCP with [facility] as well  Continue with cutting out THC and carbonation      Obesity/Morbid Obesity: Currently the patient's weight is stable. There are no medications prescribed.Treatment plan includes prescribed diet, prescribed exercise regimen and behavior modification.    I reviewed the appropriate dietary choices with the patient and encouraged the necessary changes. Recommended at least 70 grams of protein per day, around 35 grams of fats and less than 100 grams of carbohydrates. Reviewed calorie intake if patient wanted to calorie count and/or had BMR. Instructed patient to drink half of body weight in ounces per day and exercise a minimum of 150 minutes per week including both cardio and strength training. Discussed the option of keeping a food journal which will help patient become more aware of the nutritional value of foods so they are more prepared after surgery.    The patient was given written materials from our office for education.   I answered all of the patients questions regarding dietary changes, exercise or surgical options.Patient has chosen interest in SURGERYOPTIONS : GASTRIC SLEEVE  The patient will follow up in 1 month. The total time spent face to face was 20 minutes with 15 minutes spent counseling.    JOSUÉ Osei  [facility] bariatrics

## 2024-03-26 ENCOUNTER — TELEPHONE (OUTPATIENT)
Dept: BARIATRICS/WEIGHT MGMT | Facility: CLINIC | Age: 44
End: 2024-03-26
Payer: MEDICARE

## 2024-03-26 NOTE — TELEPHONE ENCOUNTER
Renuka with Dr Pantoja has left several messages for referral with lung and sleep specialist for pt. Renuka stated it is up to pt to call them at this point

## 2024-04-29 ENCOUNTER — TELEMEDICINE (OUTPATIENT)
Dept: BARIATRICS/WEIGHT MGMT | Facility: CLINIC | Age: 44
End: 2024-04-29
Payer: MEDICARE

## 2024-04-29 VITALS — BODY MASS INDEX: 41.75 KG/M2 | WEIGHT: 315 LBS | HEIGHT: 73 IN

## 2024-04-29 DIAGNOSIS — Z71.3 NUTRITIONAL COUNSELING: ICD-10-CM

## 2024-04-29 DIAGNOSIS — E66.01 OBESITY, CLASS III, BMI 40-49.9 (MORBID OBESITY): Primary | ICD-10-CM

## 2024-04-29 PROCEDURE — 1160F RVW MEDS BY RX/DR IN RCRD: CPT | Performed by: NURSE PRACTITIONER

## 2024-04-29 PROCEDURE — 99213 OFFICE O/P EST LOW 20 MIN: CPT | Performed by: NURSE PRACTITIONER

## 2024-04-29 PROCEDURE — 1159F MED LIST DOCD IN RCRD: CPT | Performed by: NURSE PRACTITIONER

## 2024-04-29 PROCEDURE — G2211 COMPLEX E/M VISIT ADD ON: HCPCS | Performed by: NURSE PRACTITIONER

## 2024-04-29 NOTE — PROGRESS NOTES
"MGK BAR SURG [facility] MEDICAL GROUP BARIATRIC SURGERY  [address] [place] [address]  [address] [place] [address]  Dept: [phone]  4/29/2024      Manish Castillo.  [id number]  [id number]  1980  male        You have chosen to receive care through a video visit. Do you consent to use a video visit for your medical care today? Yes      The patient is here for month 3/4 of their pre-operative physician supervised diet. He had a loss of 0 lbs. The patient states that he is following the recommendations given by our office and dietician including a high lean protein, low carb and low fat diet. We recommended adequate fruits and vegetable intake along with limited portion sizes. Patient is working on eliminating fast foods, fried foods, sweets and soda. Manish Castillo has been increasing his daily water intake. He has been exercising: walking 1 time a month to his psychology apt with [facility]- 22 minute walk.  Wt Readings from Last 10 Encounters:   02/20/24 (!) 158 kg (348 lb 4.8 oz)   03/27/23 (!) 158 kg (348 lb)   03/24/22 (!) 163 kg (360 lb)   07/01/21 (!) 159 kg (350 lb 8.5 oz)   05/19/21 (!) 159 kg (350 lb)   04/16/21 (!) 159 kg (350 lb)   03/11/21 (!) 153 kg (338 lb)   03/11/21 (!) 153 kg (338 lb)   02/03/21 (!) 158 kg (348 lb 9.6 oz)   01/27/21 (!) 157 kg (347 lb 3.6 oz)     Patient states they have made positive changes including had first meeting with psychiatrist   The patient admits to be struggling with cutting out thc, exercising, cutting out beer    Just had first psych apt with Dr. Carey[facility] - needs [facility] visits per [facility] requirements   Small sip up with \"thc a few weeks ago     \"Sleeping schedule off recently\"- only sleeping 4 hrs at night,     Walking to the [facility] apts - 22 minute walk ( 1 time per month)      Snacks throughout the day: baby spinach, bananas, salad, Lazaro salad , chicken, turkey , pizza , " "mexican food- no set meals due to sleep schedule   Drinks: water, no soda- beer a few times a week , skim milk, protein shake from [employer], flaxseed vitamins     Past goals:   Continue with psychiatric visits with [facility]- speak with them about concerns of anxiety post op- met has started visits - met but refusing to take psych medications prescribed by their office   Look into finding a new PCP with [facility] as well- not met but going to discuss with Psychiatrist at next visit   Continue with cutting out THC and carbonation- had a \" slip up with THC few weeks ago, no carbonation except still drinking beer a few times a week but working on cutting this out       Review of Systems   Constitutional:  Positive for activity change and appetite change.   Respiratory:  Positive for shortness of breath.    Cardiovascular: Negative.    Gastrointestinal: Negative.    Musculoskeletal:  Positive for back pain.         The following portions of the patient's history were reviewed and updated as appropriate: active problem list, medication list, allergies, social history, notes from last encounter  Past Medical History:   Diagnosis Date    CHF (congestive heart failure)     Constipation     Depression     Episodic mood disorder     Exercise-induced leg cramps     Hair loss     Heartburn     History of psychiatric hospitalization     Hypertension     Panic attacks     Pre-diabetes     PTSD (post-traumatic stress disorder)     Shortness of breath on exertion     Sleep apnea     NO MASK     Substance abuse     past and present per packet    Tired     Weight gain      Past Surgical History:   Procedure Laterality Date    CARDIAC CATHETERIZATION N/A 1/27/2021    Procedure: Left Heart Cath;  Surgeon: Darren Nice MD;  [facility]ASIVE [facility];  Service: Cardiovascular;  Laterality: N/A;    ENDOSCOPY N/A 7/1/2021    Procedure: ESOPHAGOGASTRODUODENOSCOPY with biopsy;  Surgeon: " "Little Carver MD;  Location: [facility] ENDOSCOPY;  Service: General;  Laterality: N/A;  post : normal anatomy with biopsy    MOUTH SURGERY      TONSILLECTOMY  1993    WRIST SURGERY      lead from pencil removed from wrist        Physical Exam  Constitutional:       Appearance: Normal appearance. He is obese.   Cardiovascular:      Comments: Unable to assess due to video visit   Pulmonary:      Comments: Unable to assess due to video visit   Abdominal:      Comments: Unable to assess due to video visit    Neurological:      General: No focal deficit present.      Mental Status: He is alert and oriented to person, place, and time. Mental status is at baseline.   Psychiatric:         Mood and Affect: Mood normal.         Behavior: Behavior normal.      Comments: Scattered thought process, but pt's baseline         Discussion/Plan:    New goals:  Continue with cutting out THC/ Beer  Continue meeting with psychiatrist 1 time per month for at least 5 more months  Continue drinking 1 protein supplement/ shake a day  Schedule cardiac and pulmonary apt's ( pt wanting to wait to see pulmonologist until after he has \" quit THC\").   Find and schedule a new pt apt with a primary care provider       Obesity/Morbid Obesity: Currently the patient's weight is stable. There are no medications prescribed.Treatment plan includes prescribed diet, prescribed exercise regimen and behavior modification.    I reviewed the appropriate dietary choices with the patient and encouraged the necessary changes. Recommended at least 70 grams of protein per day, around 35 grams of fats and less than 100 grams of carbohydrates. Reviewed calorie intake if patient wanted to calorie count and/or had BMR. Instructed patient to drink half of body weight in ounces per day and exercise a minimum of 150 minutes per week including both cardio and strength training. Discussed the option of keeping a food journal which will help patient become more aware of the " nutritional value of foods so they are more prepared after surgery.    The patient was given written materials from our office for education.   I answered all of the patients questions regarding dietary changes, exercise or surgical options.Patient has chosen interest in SURGERYOPTIONS : GASTRIC SLEEVE  The patient will follow up in 1 month. The total time spent face to face was 20 minutes with 15 minutes spent counseling.    Nuzhat Barksdale APRCECELIA  [facility] Bariatrics

## 2024-05-03 ENCOUNTER — TELEMEDICINE (OUTPATIENT)
Dept: BARIATRICS/WEIGHT MGMT | Facility: CLINIC | Age: 44
End: 2024-05-03
Payer: MEDICARE

## 2024-05-03 VITALS — WEIGHT: 315 LBS | HEIGHT: 73 IN | BODY MASS INDEX: 41.75 KG/M2

## 2024-05-03 DIAGNOSIS — Z71.3 NUTRITIONAL COUNSELING: ICD-10-CM

## 2024-05-03 DIAGNOSIS — E66.01 OBESITY, CLASS III, BMI 40-49.9 (MORBID OBESITY): Primary | ICD-10-CM

## 2024-05-03 NOTE — PROGRESS NOTES
MGK BAR SURG [facility] GROUP BARIATRIC SURGERY  [address] [place] [address]  [address] [place] [address]  Dept: [phone]  5/3/2024      Manish Castillo.  [id number]  [id number]  1980  male    Swl #4   Current weight: 350 pounds     The patient is here for month 4 of their pre-operative physician supervised diet. He had a gain of 2 lbs. The patient states that he is following the recommendations given by our office and dietician including a high lean protein, low carb and low fat diet. We recommended adequate fruits and vegetable intake along with limited portion sizes. Patient is working on eliminating fast foods, fried foods, sweets and soda. Manish Castillo has been increasing his daily water intake. He has been exercising: walking some but limited due to pain.  Wt Readings from Last 10 Encounters:   04/29/24 (!) 158 kg (348 lb)   02/20/24 (!) 158 kg (348 lb 4.8 oz)   03/27/23 (!) 158 kg (348 lb)   03/24/22 (!) 163 kg (360 lb)   07/01/21 (!) 159 kg (350 lb 8.5 oz)   05/19/21 (!) 159 kg (350 lb)   04/16/21 (!) 159 kg (350 lb)   03/11/21 (!) 153 kg (338 lb)   03/11/21 (!) 153 kg (338 lb)   02/03/21 (!) 158 kg (348 lb 9.6 oz)     Patient states they have made positive changes including protein supplements,   The patient admits to be struggling with cutting out THC, beer,      March 29th next psych visit       Turkey, chicken, mexican food, cook whole chicken in crock  Drinks: milk, protein shakes , no soda, some beer   Exercise: limited due to chronic pain     Past goals:   Continue with cutting out THC/ Beer-trying to  taper down - thinks having minor withdrawal symptoms ( tingling in hands )   Continue meeting with psychiatrist 1 time per month for at least 5 more months- has only completed 1/6 visits with them  Continue drinking 1 protein supplement/ shake a day- met   Schedule cardiac and pulmonary apts- not met , now has to reschedule  cardiac apt     Review of Systems   Constitutional:  Positive for fatigue.   Respiratory: Negative.     Cardiovascular: Negative.    Gastrointestinal: Negative.    Musculoskeletal:  Positive for arthralgias, back pain and myalgias.         Height 73 inches, weight 350 pounds, BMI 46.18    The following portions of the patient's history were reviewed and updated as appropriate: active problem list, medication list, allergies, social history, notes from last encounter  Past Medical History:   Diagnosis Date    CHF (congestive heart failure)     Constipation     Depression     Episodic mood disorder     Exercise-induced leg cramps     Hair loss     Heartburn     History of psychiatric hospitalization     Hypertension     Panic attacks     Pre-diabetes     PTSD (post-traumatic stress disorder)     Shortness of breath on exertion     Sleep apnea     NO MASK     Substance abuse     past and present per packet    Tired     Weight gain      Past Surgical History:   Procedure Laterality Date    CARDIAC CATHETERIZATION N/A 1/27/2021    Procedure: Left Heart Cath;  Surgeon: Darren Nice MD;  Location: [facility] CATH INVASIVE LOCATION;  Service: Cardiovascular;  Laterality: N/A;    ENDOSCOPY N/A 7/1/2021    Procedure: ESOPHAGOGASTRODUODENOSCOPY with biopsy;  Surgeon: Little Carver MD;  Location: [facility] ENDOSCOPY;  Service: General;  Laterality: N/A;  post : normal anatomy with biopsy    MOUTH SURGERY      TONSILLECTOMY  1993    WRIST SURGERY      lead from pencil removed from wrist        Physical Exam  Constitutional:       Appearance: Normal appearance. He is obese.   Cardiovascular:      Comments: Unable to assess due to video visit   Pulmonary:      Comments: Unable to assess due to video visit   Abdominal:      Comments: Unable to assess due to video visit    Neurological:      General: No focal deficit present.      Mental Status: He is alert and oriented to person, place, and time. Mental status is at baseline.    Psychiatric:      Comments: Thought process scattered      Limited due to video visit     Discussion/Plan:  Obesity/Morbid Obesity: Currently the patient's weight is increased. There are no medications prescribed.Treatment plan includes prescribed diet, prescribed exercise regimen and behavior modification.    I reviewed the appropriate dietary choices with the patient and encouraged the necessary changes. Recommended at least 70 grams of protein per day, around 35 grams of fats and less than 100 grams of carbohydrates. Reviewed calorie intake if patient wanted to calorie count and/or had BMR. Instructed patient to drink half of body weight in ounces per day and exercise a minimum of 150 minutes per week including both cardio and strength training. Discussed the option of keeping a food journal which will help patient become more aware of the nutritional value of foods so they are more prepared after surgery.    The patient was given written materials from our office for education.   I answered all of the patients questions regarding dietary changes, exercise or surgical options.Patient has chosen interest in SURGERYOPTIONS : GASTRIC SLEEVE  The patient will follow up for pre op. The total time spent face to face was 20 minutes with 15 minutes spent counseling.    PT is still missing several things before he will be able to be submitted for insurance approval. He has completed 1/6 monthly psychiatric visits , has not done his blood work , is still having trouble quitting THC and cutting out beer. IS also still missing cardiology and pulmonology clearances. Pt states he is aware of needing these things before submitting for insurance approval     JOSUÉ Osei  [facility] Bariat[facility]

## 2024-06-03 RX ORDER — HYDROCHLOROTHIAZIDE 25 MG/1
TABLET ORAL
Qty: 30 TABLET | Refills: 0 | Status: SHIPPED | OUTPATIENT
Start: 2024-06-03

## 2024-06-03 RX ORDER — METOPROLOL SUCCINATE 25 MG/1
TABLET, EXTENDED RELEASE ORAL
Qty: 30 TABLET | Refills: 0 | Status: SHIPPED | OUTPATIENT
Start: 2024-06-03

## 2024-06-03 RX ORDER — HYDROCHLOROTHIAZIDE 25 MG/1
25 TABLET ORAL DAILY
Qty: 90 TABLET | Refills: 3 | OUTPATIENT
Start: 2024-06-03

## 2024-06-03 RX ORDER — METOPROLOL SUCCINATE 25 MG/1
25 TABLET, EXTENDED RELEASE ORAL DAILY
Qty: 90 TABLET | Refills: 3 | OUTPATIENT
Start: 2024-06-03

## 2024-06-03 NOTE — TELEPHONE ENCOUNTER
Rx Refill Note  Requested Prescriptions     Pending Prescriptions Disp Refills    hydroCHLOROthiazide 25 MG tablet [Pharmacy Med Name: hydroCHLOROthiazide Oral Tablet 25 MG] 90 tablet 0     Sig: TAKE 1 TABLET BY MOUTH EVERY DAY    metoprolol succinate XL (TOPROL-XL) 25 MG 24 hr tablet [Pharmacy Med Name: Metoprolol Succinate ER Oral Tablet Extended Release 24 Hour 25 MG] 90 tablet 0     Sig: TAKE 1 TABLET BY MOUTH EVERY DAY      Last office visit with prescribing clinician: 3/27/2023   Last telemedicine visit with prescribing clinician: Visit date not found   Next office visit with prescribing clinician: 6/3/2024                         Would you like a call back once the refill request has been completed: [] Yes [] No    If the office needs to give you a call back, can they leave a voicemail: [] Yes [] No    Mckenna Castellon MA  06/03/24, 15:24 EDT

## 2024-06-03 NOTE — TELEPHONE ENCOUNTER
Rx Refill Note  Requested Prescriptions     Pending Prescriptions Disp Refills    metoprolol succinate XL (TOPROL-XL) 25 MG 24 hr tablet 90 tablet 3     Sig: Take 1 tablet by mouth Daily.    hydroCHLOROthiazide 25 MG tablet 90 tablet 3     Sig: Take 1 tablet by mouth Daily.      Last office visit with prescribing clinician: 3/27/2023   Last telemedicine visit with prescribing clinician: Visit date not found   Next office visit with prescribing clinician: 6/18/2024                         Would you like a call back once the refill request has been completed: [] Yes [] No    If the office needs to give you a call back, can they leave a voicemail: [] Yes [] No    Mckenna Castellon MA  06/03/24, 16:04 EDT

## 2024-06-05 ENCOUNTER — LAB (OUTPATIENT)
Dept: LAB | Facility: HOSPITAL | Age: 44
End: 2024-06-05
Payer: MEDICARE

## 2024-06-05 DIAGNOSIS — E66.01 OBESITY, CLASS III, BMI 40-49.9 (MORBID OBESITY): ICD-10-CM

## 2024-06-05 DIAGNOSIS — M25.50 ARTHRALGIA, UNSPECIFIED JOINT: ICD-10-CM

## 2024-06-05 DIAGNOSIS — R93.89 ABNORMAL FINDINGS ON DIAGNOSTIC IMAGING OF OTHER SPECIFIED BODY STRUCTURES: ICD-10-CM

## 2024-06-05 DIAGNOSIS — R79.9 ABNORMAL FINDING OF BLOOD CHEMISTRY, UNSPECIFIED: ICD-10-CM

## 2024-06-05 DIAGNOSIS — M79.10 MYALGIA, UNSPECIFIED SITE: ICD-10-CM

## 2024-06-05 LAB
25(OH)D3 SERPL-MCNC: 41.7 NG/ML (ref 30–100)
ALBUMIN SERPL-MCNC: 4.3 G/DL (ref 3.5–5.2)
ALBUMIN/GLOB SERPL: 1.3 G/DL
ALP SERPL-CCNC: 58 U/L (ref 39–117)
ALT SERPL W P-5'-P-CCNC: 93 U/L (ref 1–41)
AMPHET+METHAMPHET UR QL: NEGATIVE
ANION GAP SERPL CALCULATED.3IONS-SCNC: 13.9 MMOL/L (ref 5–15)
AST SERPL-CCNC: 40 U/L (ref 1–40)
BARBITURATES UR QL SCN: NEGATIVE
BASOPHILS # BLD AUTO: 0.02 10*3/MM3 (ref 0–0.2)
BASOPHILS NFR BLD AUTO: 0.3 % (ref 0–1.5)
BENZODIAZ UR QL SCN: NEGATIVE
BILIRUB SERPL-MCNC: 0.4 MG/DL (ref 0–1.2)
BUN SERPL-MCNC: 22 MG/DL (ref 6–20)
BUN/CREAT SERPL: 22.9 (ref 7–25)
CALCIUM SPEC-SCNC: 9.8 MG/DL (ref 8.6–10.5)
CANNABINOIDS SERPL QL: NEGATIVE
CHLORIDE SERPL-SCNC: 101 MMOL/L (ref 98–107)
CO2 SERPL-SCNC: 24.1 MMOL/L (ref 22–29)
COCAINE UR QL: NEGATIVE
CREAT SERPL-MCNC: 0.96 MG/DL (ref 0.76–1.27)
DEPRECATED RDW RBC AUTO: 42.5 FL (ref 37–54)
EGFRCR SERPLBLD CKD-EPI 2021: 101.8 ML/MIN/1.73
EOSINOPHIL # BLD AUTO: 0.14 10*3/MM3 (ref 0–0.4)
EOSINOPHIL NFR BLD AUTO: 2.4 % (ref 0.3–6.2)
ERYTHROCYTE [DISTWIDTH] IN BLOOD BY AUTOMATED COUNT: 13.1 % (ref 12.3–15.4)
FENTANYL UR-MCNC: NEGATIVE NG/ML
GLOBULIN UR ELPH-MCNC: 3.3 GM/DL
GLUCOSE SERPL-MCNC: 115 MG/DL (ref 65–99)
HBA1C MFR BLD: 6.11 % (ref 4.8–5.6)
HCT VFR BLD AUTO: 46.5 % (ref 37.5–51)
HGB BLD-MCNC: 15.3 G/DL (ref 13–17.7)
IMM GRANULOCYTES # BLD AUTO: 0.02 10*3/MM3 (ref 0–0.05)
IMM GRANULOCYTES NFR BLD AUTO: 0.3 % (ref 0–0.5)
LYMPHOCYTES # BLD AUTO: 1.8 10*3/MM3 (ref 0.7–3.1)
LYMPHOCYTES NFR BLD AUTO: 30.5 % (ref 19.6–45.3)
MCH RBC QN AUTO: 29.1 PG (ref 26.6–33)
MCHC RBC AUTO-ENTMCNC: 32.9 G/DL (ref 31.5–35.7)
MCV RBC AUTO: 88.4 FL (ref 79–97)
METHADONE UR QL SCN: NEGATIVE
MONOCYTES # BLD AUTO: 0.36 10*3/MM3 (ref 0.1–0.9)
MONOCYTES NFR BLD AUTO: 6.1 % (ref 5–12)
NEUTROPHILS NFR BLD AUTO: 3.57 10*3/MM3 (ref 1.7–7)
NEUTROPHILS NFR BLD AUTO: 60.4 % (ref 42.7–76)
NRBC BLD AUTO-RTO: 0 /100 WBC (ref 0–0.2)
OPIATES UR QL: NEGATIVE
OXYCODONE UR QL SCN: NEGATIVE
PLATELET # BLD AUTO: 263 10*3/MM3 (ref 140–450)
PMV BLD AUTO: 10.5 FL (ref 6–12)
POTASSIUM SERPL-SCNC: 3.9 MMOL/L (ref 3.5–5.2)
PROT SERPL-MCNC: 7.6 G/DL (ref 6–8.5)
RBC # BLD AUTO: 5.26 10*6/MM3 (ref 4.14–5.8)
SODIUM SERPL-SCNC: 139 MMOL/L (ref 136–145)
TSH SERPL DL<=0.05 MIU/L-ACNC: 2.06 UIU/ML (ref 0.27–4.2)
WBC NRBC COR # BLD AUTO: 5.91 10*3/MM3 (ref 3.4–10.8)

## 2024-06-05 PROCEDURE — 80307 DRUG TEST PRSMV CHEM ANLYZR: CPT

## 2024-06-05 PROCEDURE — 84443 ASSAY THYROID STIM HORMONE: CPT

## 2024-06-05 PROCEDURE — 80053 COMPREHEN METABOLIC PANEL: CPT

## 2024-06-05 PROCEDURE — 82306 VITAMIN D 25 HYDROXY: CPT

## 2024-06-05 PROCEDURE — 85025 COMPLETE CBC W/AUTO DIFF WBC: CPT

## 2024-06-05 PROCEDURE — 83036 HEMOGLOBIN GLYCOSYLATED A1C: CPT

## 2024-06-05 PROCEDURE — 36415 COLL VENOUS BLD VENIPUNCTURE: CPT

## 2024-06-07 LAB
COTININE UR QL SCN: NEGATIVE NG/ML
Lab: NORMAL

## 2024-06-10 ENCOUNTER — TELEPHONE (OUTPATIENT)
Dept: BARIATRICS/WEIGHT MGMT | Facility: CLINIC | Age: 44
End: 2024-06-10
Payer: MEDICARE

## 2024-06-10 NOTE — TELEPHONE ENCOUNTER
CALLER: SHALINI SINGLETON    RELATIONSHIP: SELF    MNT, NON-SURGICAL, OR SURGICAL PATIENT: SURGICAL    Surgery patients only:  ARE YOU WORKING TOWARDS SURGERY or HAVE YOU ALREADY HAD SURGERY: WORKING TOWARDS SURGERY  WHAT SURGERY DID YOU HAVE: TBD  WHEN WAS YOUR SURGERY:TBD        WHO ARE YOU REQUESTING TO SPEAK WITH: CLINICAL TEAM    WHAT WAS THE CALL REGARDING: QUESTIONS ON LAB WORK    DO YOU REQUIRE A CALLBACK or MYCHART MESSAGE: CALLBACK    BEST CALL BACK NUMBER: [phone]      Inform caller you will send a message to staff member they requested to speak with and should expect a return call within 48 hours. Unless call is high priority or urgent.

## 2024-06-10 NOTE — TELEPHONE ENCOUNTER
Called and spoke to patient, discussed labs per Nuzhat MOSES results. Patient verbalized understanding

## 2024-06-10 NOTE — PROGRESS NOTES
Nicotine negative. I believe he is still waiting on psychiatric clearance though before moving forward with surgery.

## 2024-06-14 DIAGNOSIS — I10 ESSENTIAL HYPERTENSION: Primary | ICD-10-CM

## 2024-06-17 ENCOUNTER — LAB (OUTPATIENT)
Dept: LAB | Facility: HOSPITAL | Age: 44
End: 2024-06-17
Payer: MEDICARE

## 2024-06-17 LAB
ALBUMIN SERPL-MCNC: 4.6 G/DL (ref 3.5–5.2)
ALBUMIN/GLOB SERPL: 1.4 G/DL
ALP SERPL-CCNC: 55 U/L (ref 39–117)
ALT SERPL W P-5'-P-CCNC: 113 U/L (ref 1–41)
ANION GAP SERPL CALCULATED.3IONS-SCNC: 15.8 MMOL/L (ref 5–15)
AST SERPL-CCNC: 49 U/L (ref 1–40)
BILIRUB SERPL-MCNC: 0.5 MG/DL (ref 0–1.2)
BUN SERPL-MCNC: 22 MG/DL (ref 6–20)
BUN/CREAT SERPL: 19.8 (ref 7–25)
CALCIUM SPEC-SCNC: 10 MG/DL (ref 8.6–10.5)
CHLORIDE SERPL-SCNC: 100 MMOL/L (ref 98–107)
CHOLEST SERPL-MCNC: 230 MG/DL (ref 0–200)
CO2 SERPL-SCNC: 23.2 MMOL/L (ref 22–29)
CREAT SERPL-MCNC: 1.11 MG/DL (ref 0.76–1.27)
EGFRCR SERPLBLD CKD-EPI 2021: 85.6 ML/MIN/1.73
GLOBULIN UR ELPH-MCNC: 3.2 GM/DL
GLUCOSE SERPL-MCNC: 121 MG/DL (ref 65–99)
HDLC SERPL-MCNC: 50 MG/DL (ref 40–60)
LDLC SERPL CALC-MCNC: 140 MG/DL (ref 0–100)
LDLC/HDLC SERPL: 2.71 {RATIO}
POTASSIUM SERPL-SCNC: 3.6 MMOL/L (ref 3.5–5.2)
PROT SERPL-MCNC: 7.8 G/DL (ref 6–8.5)
SODIUM SERPL-SCNC: 139 MMOL/L (ref 136–145)
TRIGL SERPL-MCNC: 222 MG/DL (ref 0–150)
VLDLC SERPL-MCNC: 40 MG/DL (ref 5–40)

## 2024-06-17 PROCEDURE — 80053 COMPREHEN METABOLIC PANEL: CPT | Performed by: INTERNAL MEDICINE

## 2024-06-17 PROCEDURE — 80061 LIPID PANEL: CPT | Performed by: INTERNAL MEDICINE

## 2024-06-17 PROCEDURE — 36415 COLL VENOUS BLD VENIPUNCTURE: CPT | Performed by: INTERNAL MEDICINE

## 2024-06-18 ENCOUNTER — OFFICE VISIT (OUTPATIENT)
Dept: CARDIOLOGY | Facility: CLINIC | Age: 44
End: 2024-06-18
Payer: MEDICARE

## 2024-06-18 VITALS
SYSTOLIC BLOOD PRESSURE: 144 MMHG | WEIGHT: 315 LBS | HEIGHT: 73 IN | OXYGEN SATURATION: 98 % | HEART RATE: 88 BPM | BODY MASS INDEX: 41.75 KG/M2 | DIASTOLIC BLOOD PRESSURE: 99 MMHG

## 2024-06-18 DIAGNOSIS — E66.01 MORBID OBESITY WITH BMI OF 45.0-49.9, ADULT: ICD-10-CM

## 2024-06-18 DIAGNOSIS — Z01.810 PREOPERATIVE CARDIOVASCULAR EXAMINATION: Primary | ICD-10-CM

## 2024-06-18 DIAGNOSIS — I25.10 CORONARY ARTERY DISEASE INVOLVING NATIVE CORONARY ARTERY OF NATIVE HEART WITHOUT ANGINA PECTORIS: ICD-10-CM

## 2024-06-18 DIAGNOSIS — I10 ESSENTIAL HYPERTENSION: ICD-10-CM

## 2024-06-18 PROCEDURE — 3077F SYST BP >= 140 MM HG: CPT | Performed by: INTERNAL MEDICINE

## 2024-06-18 PROCEDURE — 1159F MED LIST DOCD IN RCRD: CPT | Performed by: INTERNAL MEDICINE

## 2024-06-18 PROCEDURE — 3080F DIAST BP >= 90 MM HG: CPT | Performed by: INTERNAL MEDICINE

## 2024-06-18 PROCEDURE — 93000 ELECTROCARDIOGRAM COMPLETE: CPT | Performed by: INTERNAL MEDICINE

## 2024-06-18 PROCEDURE — 1160F RVW MEDS BY RX/DR IN RCRD: CPT | Performed by: INTERNAL MEDICINE

## 2024-06-18 PROCEDURE — 99214 OFFICE O/P EST MOD 30 MIN: CPT | Performed by: INTERNAL MEDICINE

## 2024-06-18 RX ORDER — LOSARTAN POTASSIUM 50 MG/1
50 TABLET ORAL DAILY
Qty: 90 TABLET | Refills: 3 | Status: SHIPPED | OUTPATIENT
Start: 2024-06-18

## 2024-06-18 RX ORDER — PRAVASTATIN SODIUM 20 MG
20 TABLET ORAL DAILY
Qty: 90 TABLET | Refills: 3 | Status: SHIPPED | OUTPATIENT
Start: 2024-06-18

## 2024-06-18 NOTE — PROGRESS NOTES
Subjective:     Encounter Date:06/18/2024      Patient ID: Manish Castillo is a 43 y.o. male.    Chief Complaint and history of present illness:     Follow-up for hypertension CAD obesity BMI over 40     History of Present Illness  :     Mr. Manish Castillo has PMH of     -CAD,  cath 1/27/2021 tortuous RCA with a kink in PLV causing 50% narrowing  -Hypertension  -Morbid obesity  -On disability due to psych disorder PTSD and panic attacks and THC  -Positive family history of CAD in mother     Here for follow-up.  Patient was seen for bariatric surgery evaluation.  Patient has history of drug abuse therefore had to become clean before he can have surgery.  Denies any chest pain or shortness of breath..     Patient's arterial blood pressure is 144/99, heart rate 88 bpm.  BMI is over 45.     Patient underwent stress test 1/7/2021 which revealed large inferior ischemia.  The cath 1/27/2021 revealed kinking RCA causing 50% narrowing     Labs from 2/21/2022 reveal a with a ALT 93, AST 32, glucose 115, hemoglobin A1c 5.8.  Profile with cholesterol 228, triglycerides 327, HDL 46, .  Labs from 3/20/2023 reveal lipid profile with cholesterol 166, triglycerides 239, HDL low at 33, LDL 92.  CMP with glucose of 166, ALT 45        Assessment :       CAD  Hypertension  Morbid obesity with BMI over 45  Dyslipidemia  Positive family history of heart disease  Hyperglycemia, prediabetes  Elevated LFTs     Recommendations and plan :     Reviewed EKG and lab results with patient.  Patient is refusing statins because he had family members having kidney disease with statins.  Will give him low-dose pravastatin.  Will add losartan for better blood pressure control.  Will continue aspirin metoprolol losartan and pravastatin as tolerated.  Check labs before visit.  Patient has noncritical disease asymptomatic and fairly active should be okay from cardiovascular standpoint for bariatric surgery.    Advised patient to check blood  pressure at home.  Reviewed elevated LFTs and prediabetes with patient.  Reviewed BMI over 45, counseled on weight loss diet and exercise  Counseled on smoking and marijuana cessation.  We will follow-up and check lipid profile and CMP and A1c before next visit.           ECG 12 Lead    Date/Time: 6/18/2024 12:35 PM  Performed by: Darren Nice MD    Authorized by: Darren Nice MD  Comparison: compared with previous ECG from 3/27/2023  Comparison to previous ECG: EKG done today reviewed/interpreted by me reveals sinus rhythm with a rate of 77 bpm with nonspecific ST-T changes.  No significant change compared EKG from 3/27/2023.          Copied text in this portion of the note has been reviewed and is accurate as of 6/18/2024  The following portions of the patient's history were reviewed and updated as appropriate: allergies, current medications, past family history, past medical history, past social history, past surgical history and problem list.    Assessment:         MDM       Diagnosis Plan   1. Preoperative cardiovascular examination        2. Essential hypertension        3. Coronary artery disease involving native coronary artery of native heart without angina pectoris        4. Morbid obesity with BMI of 45.0-49.9, adult               Plan:               Past Medical History:  Past Medical History:   Diagnosis Date    CHF (congestive heart failure)     Constipation     Depression     Episodic mood disorder     Exercise-induced leg cramps     Hair loss     Heartburn     History of psychiatric hospitalization     Hypertension     Panic attacks     Pre-diabetes     PTSD (post-traumatic stress disorder)     Shortness of breath on exertion     Sleep apnea     NO MASK     Substance abuse     past and present per packet    Tired     Weight gain      Past Surgical History:  Past Surgical History:   Procedure Laterality Date    CARDIAC CATHETERIZATION N/A 1/27/2021    Procedure: Left Heart  Cath;  Surgeon: Darren Nice MD;  Location: [facility] CATH INVASIVE LOCATION;  Service: Cardiovascular;  Laterality: N/A;    ENDOSCOPY N/A 7/1/2021    Procedure: ESOPHAGOGASTRODUODENOSCOPY with biopsy;  Surgeon: Little Carver MD;  Location: [facility] ENDOSCOPY;  Service: General;  Laterality: N/A;  post : normal anatomy with biopsy    MOUTH SURGERY      TONSILLECTOMY  1993    WRIST SURGERY      lead from pencil removed from wrist      Allergies:  Allergies   Allergen Reactions    Aspirin Other (See Comments)     Must be coated asprin.      Home Meds:  Current Meds:     Current Outpatient Medications:     aspirin (aspirin) 81 MG EC tablet, Take 1 tablet by mouth Daily., Disp: 100 tablet, Rfl: 3    Flaxseed, Linseed, (Flax Seed Oil) 1000 MG capsule, Take  by mouth., Disp: , Rfl:     GARLIC PO, Take  by mouth., Disp: , Rfl:     hydroCHLOROthiazide 25 MG tablet, TAKE 1 TABLET BY MOUTH EVERY DAY, Disp: 30 tablet, Rfl: 0    metoprolol succinate XL (TOPROL-XL) 25 MG 24 hr tablet, TAKE 1 TABLET BY MOUTH EVERY DAY, Disp: 30 tablet, Rfl: 0    Multiple Vitamins-Minerals (MULTI ADULT GUMMIES PO), Take  by mouth., Disp: , Rfl:     losartan (Cozaar) 50 MG tablet, Take 1 tablet by mouth Daily., Disp: 90 tablet, Rfl: 3    Misc Natural Products (FIBER 7 PO), Take  by mouth., Disp: , Rfl:     Omega-3 Fatty Acids (Fish Oil) 435 MG capsule, Take  by mouth., Disp: , Rfl:     Plant Sterols and Stanols (CHOLEST OFF PO), Take  by mouth., Disp: , Rfl:     pravastatin (Pravachol) 20 MG tablet, Take 1 tablet by mouth Daily., Disp: 90 tablet, Rfl: 3    temazepam (RESTORIL) 30 MG capsule, Take 1 capsule by mouth At Night As Needed for Sleep. One dose for sleep study to be done at the hospital, Disp: 1 capsule, Rfl: 0    vitamin B-12 (CYANOCOBALAMIN) 1000 MCG tablet, Take 1 tablet by mouth Daily., Disp: , Rfl:     Vitamin D, Cholecalciferol, (CHOLECALCIFEROL) 10 MCG (400 UNIT) tablet, Take 1 tablet by mouth Daily., Disp: , Rfl:   Social  "History:   Social History     Tobacco Use    Smoking status: Former     Current packs/day: 0.00     Types: Cigarettes     Quit date:      Years since quittin.4     Passive exposure: Past    Smokeless tobacco: Never   Substance Use Topics    Alcohol use: Yes     Alcohol/week: 12.0 standard drinks of alcohol     Types: 12 Cans of beer per week     Comment: occassional [place]       Family History:  Family History   Problem Relation Age of Onset    Heart disease Mother     Hypertension Mother     Sleep apnea Mother     Diabetes Paternal Grandmother               Review of Systems   Constitutional: Negative for malaise/fatigue.   Cardiovascular:  Positive for dyspnea on exertion. Negative for chest pain, leg swelling and palpitations.   Respiratory:  Negative for shortness of breath.    Skin:  Negative for rash.   Neurological:  Negative for dizziness, light-headedness and numbness.     All other systems are negative         Objective:     Physical Exam  /99   Pulse 88   Ht 185.4 cm (73\")   Wt (!) 157 kg (347 lb)   SpO2 98%   BMI 45.78 kg/m²   General:  Appears in no acute distress  Eyes: Sclera is anicteric,  conjunctiva is clear   HEENT:  No JVD.  No carotid bruits  Respiratory: Respirations regular and unlabored at rest.  Clear to auscultation  Cardiovascular: S1,S2 Regular rate and rhythm. No murmur, rub or gallop auscultated.   Extremities: No digital clubbing or cyanosis, no edema  Skin: Color pink. Skin warm and dry to touch. No rashes  No xanthoma  Neuro: Alert and awake.    Lab Reviewed:         Darren Nice MD  2024 12:37 EDT      EMR Dragon/Transcription:   \"Dictated utilizing Dragon dictation\".        "

## 2024-06-19 ENCOUNTER — TELEPHONE (OUTPATIENT)
Dept: CARDIOLOGY | Facility: CLINIC | Age: 44
End: 2024-06-19
Payer: MEDICARE

## 2024-07-01 ENCOUNTER — TELEPHONE (OUTPATIENT)
Dept: CARDIOLOGY | Facility: CLINIC | Age: 44
End: 2024-07-01
Payer: MEDICARE

## 2024-07-01 RX ORDER — HYDROCHLOROTHIAZIDE 25 MG/1
25 TABLET ORAL DAILY
Qty: 90 TABLET | Refills: 3 | Status: SHIPPED | OUTPATIENT
Start: 2024-07-01

## 2024-07-01 RX ORDER — METOPROLOL SUCCINATE 25 MG/1
TABLET, EXTENDED RELEASE ORAL
Qty: 90 TABLET | Refills: 3 | Status: SHIPPED | OUTPATIENT
Start: 2024-07-01

## 2024-07-01 NOTE — TELEPHONE ENCOUNTER
Caller: Manish Castillo    Relationship: Self    Best call back number: [phone]    What is the best time to reach you: ANY    Who are you requesting to speak with (clinical staff, provider,  specific staff member): CLINICAL       What was the call regarding: PATIENT STATES THAT HE WOULD LIKE A 90 DAY SUPPLY FOR HYDROCHLOROTHIAZIDE 25 MG AND METOPROLOL SUCCINATE XL 25 MG. SENT TO [facility] IN [place]. PLEASE CONTACT PATIENT TO ADVISE. PATIENT HAS LESS THAN THREE DAYS.    Is it okay if the provider responds through [facility]:YES OR CALL

## 2024-07-01 NOTE — TELEPHONE ENCOUNTER
Rx Refill Note  Requested Prescriptions     Pending Prescriptions Disp Refills    metoprolol succinate XL (TOPROL-XL) 25 MG 24 hr tablet [Pharmacy Med Name: Metoprolol Succinate ER Oral Tablet Extended Release 24 Hour 25 MG] 90 tablet 3     Sig: TAKE 1 TABLET BY MOUTH EVERY DAY      Last office visit with prescribing clinician: 6/18/2024   Last telemedicine visit with prescribing clinician: Visit date not found   Next office visit with prescribing clinician: 7/1/2024                         Would you like a call back once the refill request has been completed: [] Yes [] No    If the office needs to give you a call back, can they leave a voicemail: [] Yes [] No    Carleen Ibanez MA  07/01/24, 16:10 EDT

## 2024-07-01 NOTE — TELEPHONE ENCOUNTER
Rx Refill Note  Requested Prescriptions      No prescriptions requested or ordered in this encounter      Last office visit with prescribing clinician: 6/18/2024   Last telemedicine visit with prescribing clinician: Visit date not found   Next office visit with prescribing clinician: 6/23/2025                         Would you like a call back once the refill request has been completed: [] Yes [] No    If the office needs to give you a call back, can they leave a voicemail: [] Yes [] No    Mckenna Castellon MA  07/01/24, 16:18 EDT

## 2024-07-10 ENCOUNTER — TELEPHONE (OUTPATIENT)
Dept: BARIATRICS/WEIGHT MGMT | Facility: CLINIC | Age: 44
End: 2024-07-10
Payer: MEDICARE

## 2024-07-10 NOTE — TELEPHONE ENCOUNTER
PT CALLED TO UPDATE OFFICE ON PROGRESS     PT WAS EXPOSED TO [facility], HAS OPTED TO WAIT TO DO LABS AND MAKE AN APPT WITH DR GREGORY. PT ALSO STATED [facility]

## 2024-07-27 ENCOUNTER — HOSPITAL ENCOUNTER (OUTPATIENT)
Facility: HOSPITAL | Age: 44
Discharge: HOME | End: 2024-07-27
Attending: EMERGENCY MEDICINE | Admitting: EMERGENCY MEDICINE
Payer: MEDICARE

## 2024-07-27 ENCOUNTER — APPOINTMENT (OUTPATIENT)
Dept: GENERAL RADIOLOGY | Facility: HOSPITAL | Age: 44
End: 2024-07-27
Payer: MEDICARE

## 2024-07-27 VITALS
RESPIRATION RATE: 18 BRPM | DIASTOLIC BLOOD PRESSURE: 111 MMHG | OXYGEN SATURATION: 100 % | HEIGHT: 75 IN | SYSTOLIC BLOOD PRESSURE: 167 MMHG | WEIGHT: 315 LBS | TEMPERATURE: 98.3 F | HEART RATE: 106 BPM | BODY MASS INDEX: 39.17 KG/M2

## 2024-07-27 DIAGNOSIS — M25.572 ACUTE LEFT ANKLE PAIN: Primary | ICD-10-CM

## 2024-07-27 PROCEDURE — 99213 OFFICE O/P EST LOW 20 MIN: CPT

## 2024-07-27 PROCEDURE — 73630 X-RAY EXAM OF FOOT: CPT

## 2024-07-27 PROCEDURE — G0463 HOSPITAL OUTPT CLINIC VISIT: HCPCS

## 2024-07-27 RX ORDER — MELOXICAM 7.5 MG/1
7.5 TABLET ORAL DAILY
Qty: 7 TABLET | Refills: 0 | Status: SHIPPED | OUTPATIENT
Start: 2024-07-27 | End: 2024-08-03

## 2024-07-28 ENCOUNTER — NURSE TRIAGE (OUTPATIENT)
Dept: CALL CENTER | Facility: HOSPITAL | Age: 44
End: 2024-07-28
Payer: MEDICARE

## 2024-07-28 NOTE — TELEPHONE ENCOUNTER
"Patient called reporting continued severe pain in right foot. Was seen in [facility], evaluated, prescribed Meloxicam. States pain is 9.5/10 and he can not walk, increased swelling. States he needs pain management. Advised patient he needs to go back to ER or [facility] for evaluation. Verbalizes understanding.       Reason for Disposition   [1] SEVERE pain (e.g., excruciating, unable to do any normal activities) AND [2] not improved after 2 hours of pain medicine    Additional Information   Negative: Followed a foot injury   Negative: Diabetes mellitus   Negative: Toe pain is main symptom   Negative: Ankle pain is main symptom   Negative: Thigh or calf pain is main symptom   Negative: Entire foot is cool or blue in comparison to other foot   Negative: Purple or black skin on foot or toe   Negative: [1] Red area or streak AND [2] fever   Negative: [1] Swollen foot AND [2] fever   Negative: Patient sounds very sick or weak to the triager    Answer Assessment - Initial Assessment Questions  1. ONSET: \"When did the pain start?\"       3days ago   2. LOCATION: \"Where is the pain located?\"       Right foot on top   3. PAIN: \"How bad is the pain?\"    (Scale 1-10; or mild, moderate, severe)   - MILD (1-3): doesn't interfere with normal activities.    - MODERATE (4-7): interferes with normal activities (e.g., work or school) or awakens from sleep, limping.    - SEVERE (8-10): excruciating pain, unable to do any normal activities, unable to walk.       9.5/10  4. WORK OR EXERCISE: \"Has there been any recent work or exercise that involved this part of the body?\"       no  5. CAUSE: \"What do you think is causing the foot pain?\"      uncertain  6. OTHER SYMPTOMS: \"Do you have any other symptoms?\" (e.g., leg pain, rash, fever, numbness)      no  7. PREGNANCY: \"Is there any chance you are pregnant?\" \"When was your last menstrual period?\"      N/a    Protocols used: Foot Pain-ADULT-AH    "

## 2024-07-28 NOTE — [facility] PROVIDER NOTE
Subjective   History of Present Illness  43-year-old male reports a 3-day history of pain to his right foot.  He denies injury or trauma he reports that last night he did drink gin being in [employer] and wonders if he could have strained his right foot.  He is denying a history of gout but wonders if he could have gout in his foot.  He reports that the pain seems to be moving from the ankle to the top of his foot to the instep.        Review of Systems   All other systems reviewed and are negative.      Past Medical History:   Diagnosis Date    CHF (congestive heart failure)     Constipation     Depression     Episodic mood disorder     Exercise-induced leg cramps     Hair loss     Heartburn     History of psychiatric hospitalization     Hypertension     Panic attacks     Pre-diabetes     PTSD (post-traumatic stress disorder)     Shortness of breath on exertion     Sleep apnea     NO MASK     Substance abuse     past and present per packet    Tired     Weight gain        Allergies   Allergen Reactions    Aspirin Other (See Comments)     Must be coated asprin.        Past Surgical History:   Procedure Laterality Date    CARDIAC CATHETERIZATION N/A 1/27/2021    Procedure: Left Heart Cath;  Surgeon: Darren Nice MD;  Location: [facility] CATH INVASIVE LOCATION;  Service: Cardiovascular;  Laterality: N/A;    ENDOSCOPY N/A 7/1/2021    Procedure: ESOPHAGOGASTRODUODENOSCOPY with biopsy;  Surgeon: Little Carver MD;  Location: [facility] ENDOSCOPY;  Service: General;  Laterality: N/A;  post : normal anatomy with biopsy    MOUTH SURGERY      TONSILLECTOMY  1993    WRIST SURGERY      lead from pencil removed from wrist       Family History   Problem Relation Age of Onset    Heart disease Mother     Hypertension Mother     Sleep apnea Mother     Diabetes Paternal Grandmother        Social History     Socioeconomic History    Marital status: Single   Tobacco Use    Smoking status: Former     Current packs/day: 0.00     Types:  Cigarettes     Quit date:      Years since quittin.5     Passive exposure: Past    Smokeless tobacco: Never   Vaping Use    Vaping status: Former    Substances: Nicotine, THC   Substance and Sexual Activity    Alcohol use: Yes     Alcohol/week: 12.0 standard drinks of alcohol     Types: 12 Cans of beer per week     Comment: daily    Drug use: Yes     Frequency: 7.0 times per week     Types: Marijuana     Comment: canabis daily     Sexual activity: Defer           Objective   Physical Exam  Constitutional:       Appearance: He is obese.   HENT:      Head: Normocephalic.      Nose: Nose normal.      Mouth/Throat:      Mouth: Mucous membranes are moist.   Eyes:      Extraocular Movements: Extraocular movements intact.      Pupils: Pupils are equal, round, and reactive to light.   Cardiovascular:      Pulses: Normal pulses.   Pulmonary:      Effort: Pulmonary effort is normal.      Breath sounds: Normal breath sounds.   Abdominal:      General: Abdomen is flat.      Palpations: Abdomen is soft.   Neurological:      General: No focal deficit present.      Mental Status: He is oriented to person, place, and time.         Procedures           ED Course  ED Course as of 24 2200   Sat 2024   2143 X-ray right foot  Impression:  No acute osseous abnormality.   [WF]      ED Course User Index  [WF] Stephan Bautista Jr., APRN                                           Medical Decision Making  Patient states that ibuprofen does not help him he is requesting a higher dose anti-inflammatory medication will discharge home on a short course of Mobic    Problems Addressed:  Acute left ankle pain: complicated acute illness or injury    Amount and/or Complexity of Data Reviewed  Radiology: ordered.    Risk  Prescription drug management.        Final diagnoses:   Acute left ankle pain       ED Disposition  ED Disposition       ED Disposition   Discharge    Condition   Stable    Comment   --               Arlet  Darren Talamantes MD  [address] [facility] [place] [address] [phone]               Medication List        New Prescriptions      meloxicam 7.5 MG tablet  Commonly known as: MOBIC  Take 1 tablet by mouth Daily for 7 days.               Where to Get Your Medications        These medications were sent to [facility]/pharmacy #[id number] [facility], [place] - [address] [facility] - [phone]  - [phone] [address] [place] [address]      Hours: 24-hours Phone: [phone]   meloxicam 7.5 MG tablet

## 2024-07-28 NOTE — DISCHARGE INSTRUCTIONS
Take Tylenol as needed for pain    Take Mobic as needed for pain    When resting of your left ankle elevation    Pain persist follow-up with family doctor    Return to ER for worsening symptom

## 2024-08-12 ENCOUNTER — TELEPHONE (OUTPATIENT)
Dept: BARIATRICS/WEIGHT MGMT | Facility: CLINIC | Age: 44
End: 2024-08-12
Payer: MEDICARE

## 2024-08-12 NOTE — TELEPHONE ENCOUNTER
PT CALLED STATED [facility] HAS CLEARED HIM/// QUESTIONING THE NEXT STEP AND DOES HE NEED ANYTHING ELSE    PLEASE RETURN CALL .[phone] AND ADVISE

## 2024-08-12 NOTE — TELEPHONE ENCOUNTER
Called and spoke with patient. Need Pulmonary, Psych and PCP clearance prior to submitting for PA for Bariatric Sx. Per patient, will call Dr. Pantoja office, gave patient phone # to their office today, to schedule an appointment. Per patient, there is a NP close to his home that he will get established with to get clearance for Sx. Psych- Per patient, his psych sent records to [facility] and waiting on them to call him to say cleared for surgery.

## 2024-08-26 ENCOUNTER — TELEPHONE (OUTPATIENT)
Dept: BARIATRICS/WEIGHT MGMT | Facility: CLINIC | Age: 44
End: 2024-08-26
Payer: MEDICARE

## 2024-08-26 NOTE — TELEPHONE ENCOUNTER
Called and spoke with patient. PCP clearance form that needs to be signed will be at the  for patient to . States will  before Sept. 9.

## 2024-08-26 NOTE — TELEPHONE ENCOUNTER
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  HE STATES THAT THERE IS A PAPERWORK THAT HE NEEDS TO GET FROM US TO GIVE TO THEM TO CLEAR HIM. HE WAS WONDERING IF WE CAN GET IT READY FOR HIM TO  ON 08.30.2024    DO YOU REQUIRE A CALLBACK or PENRITHT MESSAGE: CALLBACK     BEST CALL BACK NUMBER: [phone]      Inform caller you will send a message to staff member they requested to speak with and should expect a return call within 48 hours. Unless call is high priority or urgent.

## 2024-08-26 NOTE — TELEPHONE ENCOUNTER
CALLER: SHALINI SINGLETON     RELATIONSHIP: SELF     MNT, NON-SURGICAL, OR SURGICAL PATIENT: SURGICAL     Surgery patients only:  ARE YOU WORKING TOWARDS SURGERY or HAVE YOU ALREADY HAD SURGERY: WORKING TOWARDS SURGYER  WHAT SURGERY DID YOU HAVE: N/A  WHEN WAS YOUR SURGERY:N/A           WHO ARE YOU REQUESTING TO SPEAK WITH: ZAHIDA     WHAT WAS THE CALL REGARDING: PT CALLED AND SPOKE TO HIS NEW PCP.  HE STATES THAT THERE IS A PAPERWORK THAT HE NEEDS TO GET FROM US TO GIVE TO THEM TO CLEAR HIM. HE WAS WONDERING IF WE CAN GET IT READY FOR HIM TO  ON 08.30.2024     DO YOU REQUIRE A CALLBACK or A and A Travel ServiceT MESSAGE: CALLBACK      BEST CALL BACK NUMBER: [phone]        Inform caller you will send a message to staff member they requested to speak with and should expect a return call within 48 hours. Unless call is high priority or urgent.

## 2024-08-29 NOTE — PROGRESS NOTES
Subjective:     Encounter Date:08/30/2024      Patient ID: Manish Castillo is a 43 y.o. male.    Chief Complaint: 1 month follow-up hypertension    History of Present Illness    Mr. Manish Castillo has PMH of     -CAD,  cath 1/27/2021 tortuous RCA with a kink in PLV causing 50% narrowing  -Hypertension  -Morbid obesity  -Palpitations  -On disability due to psych disorder PTSD and panic attacks and THC  -Positive family history of CAD in mother    Here for 1 month follow-up for hypertension.  Patient was started on losartan and has had improvement in his blood pressure although it is still mildly elevated.  He reports that about 2 weeks ago he started having palpitations again.  He called EMS to have an EKG arriving and they told him that he had PVCs.  He states that his mom told him that maybe it was from the losartan.  He also states that his mom told him not to take pravastatin due to side effects therefore he has not been taking it and he takes Colest off over-the-counter instead.      Today he denies any chest pain shortness of breath lower extremity edema or palpitations.  Blood pressure today 154/81 in the left arm 147/97 in the right arm heart rate 92 oxygen 99% on room air he does report that he quit smoking and has quit smoking marijuana he is working to quit drinking alcohol for his bariatric surgery.         Patient underwent stress test 1/7/2021 which revealed large inferior ischemia.  The cath 1/27/2021 revealed kinking RCA causing 50% narrowing     Labs from 2/21/2022 reveal a with a ALT 93, AST 32, glucose 115, hemoglobin A1c 5.8.  Profile with cholesterol 228, triglycerides 327, HDL 46, .  Labs from 3/20/2023 reveal lipid profile with cholesterol 166, triglycerides 239, HDL low at 33, LDL 92.  CMP with glucose of 166, ALT 45       Lab Review:   6/17/2024 BUN 22 creatinine 1.11 glucose 121 AST 49  total cholesterol 230 HDL 50  triglycerides 222      The following portions of  "the patient's history were reviewed and updated as appropriate: allergies, current medications, past family history, past medical history, past social history, past surgical history, and problem list.      Review of Systems   Constitutional: Negative for malaise/fatigue.   Cardiovascular:  Positive for palpitations. Negative for chest pain, dyspnea on exertion and leg swelling.   Respiratory:  Negative for cough and shortness of breath.    Gastrointestinal:  Negative for abdominal pain, nausea and vomiting.   Neurological:  Negative for dizziness, focal weakness, headaches, light-headedness and numbness.   All other systems reviewed and are negative.      Past Medical History:   Diagnosis Date    CHF (congestive heart failure)     Constipation     Depression     Episodic mood disorder     Exercise-induced leg cramps     Hair loss     Heartburn     History of psychiatric hospitalization     Hypertension     Panic attacks     Pre-diabetes     PTSD (post-traumatic stress disorder)     Shortness of breath on exertion     Sleep apnea     NO MASK     Substance abuse     past and present per packet    Tired     Weight gain      Past Surgical History:   Procedure Laterality Date    CARDIAC CATHETERIZATION N/A 1/27/2021    Procedure: Left Heart Cath;  Surgeon: Darren Nice MD;  Location: [facility] CATH INVASIVE LOCATION;  Service: Cardiovascular;  Laterality: N/A;    ENDOSCOPY N/A 7/1/2021    Procedure: ESOPHAGOGASTRODUODENOSCOPY with biopsy;  Surgeon: Little Carver MD;  Location: [facility] ENDOSCOPY;  Service: General;  Laterality: N/A;  post : normal anatomy with biopsy    MOUTH SURGERY      TONSILLECTOMY  1993    WRIST SURGERY      lead from pencil removed from wrist     /97 (BP Location: Right arm, Patient Position: Sitting, Cuff Size: Adult)   Pulse 92   Ht 185.4 cm (73\")   Wt (!) 156 kg (343 lb)   SpO2 99%   BMI 45.25 kg/m²   Family History   Problem Relation Age of Onset    Heart disease Mother     " Hypertension Mother     Sleep apnea Mother     Diabetes Paternal Grandmother        Current Outpatient Medications:     aspirin (aspirin) 81 MG EC tablet, Take 1 tablet by mouth Daily., Disp: 100 tablet, Rfl: 3    Flaxseed, Linseed, (Flax Seed Oil) 1000 MG capsule, Take  by mouth., Disp: , Rfl:     hydroCHLOROthiazide 25 MG tablet, Take 1 tablet by mouth Daily., Disp: 90 tablet, Rfl: 3    losartan (Cozaar) 50 MG tablet, Take 1 tablet by mouth Daily., Disp: 90 tablet, Rfl: 3    metoprolol succinate XL (TOPROL-XL) 50 MG 24 hr tablet, Take 1 tablet by mouth Daily., Disp: 90 tablet, Rfl: 3    Multiple Vitamins-Minerals (MULTI ADULT GUMMIES PO), Take  by mouth., Disp: , Rfl:     Omega-3 Fatty Acids (Fish Oil) 435 MG capsule, Take  by mouth., Disp: , Rfl:     Plant Sterols and Stanols (CHOLEST OFF PO), Take  by mouth., Disp: , Rfl:     Allergies   Allergen Reactions    Aspirin Other (See Comments)     Must be coated asprin.      Social History     Socioeconomic History    Marital status: Single   Tobacco Use    Smoking status: Former     Current packs/day: 0.00     Types: Cigarettes     Quit date:      Years since quittin.6     Passive exposure: Past    Smokeless tobacco: Never   Vaping Use    Vaping status: Former    Substances: Nicotine, THC   Substance and Sexual Activity    Alcohol use: Yes     Alcohol/week: 12.0 standard drinks of alcohol     Types: 12 Cans of beer per week     Comment: daily    Drug use: Not Currently     Frequency: 7.0 times per week     Types: Marijuana     Comment: stopped for sx 24    Sexual activity: Defer                Objective:     Vitals reviewed.   Constitutional:       Appearance: Not in distress. Morbidly obese.   Neck:      Vascular: No JVR. JVD normal.   Pulmonary:      Effort: Pulmonary effort is normal.      Breath sounds: Normal breath sounds. No wheezing. No rhonchi. No rales.   Chest:      Chest wall: Not tender to palpatation.   Cardiovascular:      PMI at left  midclavicular line. Normal rate. Regular rhythm. Normal S1. Normal S2.       Murmurs: There is no murmur.      No gallop.  No click. No rub.   Pulses:     Intact distal pulses.   Edema:     Peripheral edema absent.   Abdominal:      General: Bowel sounds are normal.      Palpations: Abdomen is soft.      Tenderness: There is no abdominal tenderness.   Musculoskeletal: Normal range of motion.         General: No tenderness. Skin:     General: Skin is warm and dry.   Neurological:      General: No focal deficit present.      Mental Status: Alert and oriented to person, place and time.       Procedures                  Assessment:     MDM       Diagnosis Plan   1. Essential hypertension        2. BMI 45.0-49.9, adult        3. Morbid obesity        4. Dyspnea on exertion        5. Palpitations                       Plan:   Chart reviewed  Labs reviewed  Medications reviewed  Patient complaining of palpitations 2 weeks ago and had EKG done by EMS that showed PVCs apparently    Blood pressure still not ideally controlled on current medication  Advised patient to increase metoprolol to 50 mg daily  Continue losartan hydrochlorothiazide    Continue aspirin  Discussed cholesterol medication with patient he does not wish to take statins and is on over-the-counter fish oil and Colest off.       Patient is still cleared for bariatric surgery    Keep follow-up appointment with Dr. Nice next June unless needing to be seen sooner    Advised patient to monitor blood pressure at home          Electronically signed by JOSUÉ Cruz, 08/30/24, 3:07 PM EDT.              This document is intended for medical expert use only.  Reading of this document by patients and/or patient's family without participating medical staff guidance may result in misinterpretation and unintended morbidity. Any interpretation of such data is the responsibility of the patient and/or family member responsible for the patient in concert with their  primary or specialist providers, not to be left for sources of online search as such as [employer], [employer] or similar queries.  Relying on these approaches to knowledge may result in misinterpretation, misguided goals of care and even death should patient or family members try recommendations outside of the realm of professional medical care in a supervised inpatient environment.

## 2024-08-30 ENCOUNTER — OFFICE VISIT (OUTPATIENT)
Dept: CARDIOLOGY | Facility: CLINIC | Age: 44
End: 2024-08-30
Payer: MEDICARE

## 2024-08-30 VITALS
HEIGHT: 73 IN | SYSTOLIC BLOOD PRESSURE: 147 MMHG | OXYGEN SATURATION: 99 % | WEIGHT: 315 LBS | DIASTOLIC BLOOD PRESSURE: 97 MMHG | BODY MASS INDEX: 41.75 KG/M2 | HEART RATE: 92 BPM

## 2024-08-30 DIAGNOSIS — I10 ESSENTIAL HYPERTENSION: Primary | ICD-10-CM

## 2024-08-30 DIAGNOSIS — R00.2 PALPITATIONS: ICD-10-CM

## 2024-08-30 DIAGNOSIS — E66.01 MORBID OBESITY: ICD-10-CM

## 2024-08-30 DIAGNOSIS — R06.09 DYSPNEA ON EXERTION: ICD-10-CM

## 2024-08-30 PROCEDURE — 3079F DIAST BP 80-89 MM HG: CPT | Performed by: NURSE PRACTITIONER

## 2024-08-30 PROCEDURE — 3077F SYST BP >= 140 MM HG: CPT | Performed by: NURSE PRACTITIONER

## 2024-08-30 PROCEDURE — 99214 OFFICE O/P EST MOD 30 MIN: CPT | Performed by: NURSE PRACTITIONER

## 2024-08-30 RX ORDER — METOPROLOL SUCCINATE 50 MG/1
50 TABLET, EXTENDED RELEASE ORAL DAILY
Qty: 90 TABLET | Refills: 3 | Status: SHIPPED | OUTPATIENT
Start: 2024-08-30

## 2024-09-10 ENCOUNTER — TELEPHONE (OUTPATIENT)
Dept: BARIATRICS/WEIGHT MGMT | Facility: CLINIC | Age: 44
End: 2024-09-10
Payer: MEDICARE

## 2024-09-10 NOTE — TELEPHONE ENCOUNTER
Patient called, expressing frustration with getting psych clearance. States [facility] wouldn't accept clearance letter from patient psych and requiring records for review. Patient also states scheduled for sleep study with follow up in Nov and requires c-pap usage consistency. I suggested [facility] and let them know that his current psych does not do talk therapy and is done with the appointment within 5 min. [facility] sometimes offers talk therapy with their students and this may suffice for the [facility] requirements but patient will need to call [employer] [facility] and ask them.

## 2024-09-19 ENCOUNTER — TELEPHONE (OUTPATIENT)
Dept: BARIATRICS/WEIGHT MGMT | Facility: CLINIC | Age: 44
End: 2024-09-19
Payer: MEDICARE

## 2024-10-09 ENCOUNTER — TELEPHONE (OUTPATIENT)
Dept: BARIATRICS/WEIGHT MGMT | Facility: CLINIC | Age: 44
End: 2024-10-09
Payer: MEDICARE

## 2024-10-09 NOTE — TELEPHONE ENCOUNTER
Patient called, states per [facility], needs to meet with a therapist for 6 months before they will consider clearance. Also states working on starting c-pap to get Pulmonary clearance. Due to this, patient will need to re-do SWL visits due to Medicare requires 4 consecutive months within 6 months of surgery. Per patient due to transportation, will need to do telehealth visits. Will call us back to schedule once able to purchase a scale. One of the appointments will need to be in-person with Dr. Carver and patient is aware of this.

## 2024-11-13 ENCOUNTER — TELEMEDICINE (OUTPATIENT)
Dept: BARIATRICS/WEIGHT MGMT | Facility: CLINIC | Age: 44
End: 2024-11-13
Payer: MEDICARE

## 2024-11-13 VITALS — WEIGHT: 315 LBS | BODY MASS INDEX: 41.75 KG/M2 | HEIGHT: 73 IN

## 2024-11-13 DIAGNOSIS — E66.01 OBESITY, CLASS III, BMI 40-49.9 (MORBID OBESITY): Primary | ICD-10-CM

## 2024-11-13 NOTE — PROGRESS NOTES
Nutrition Services    Patient Name: Manish Castillo  YOB: 1980  MRN: [id number]  Date of Service: 11/13/24      ICD-10-CM ICD-9-CM   1. Obesity, Class III, BMI 40-49.9 (morbid obesity)  E66.01 278.01        You have chosen to receive care through a MyChart video visit. Do you consent to use a MyChart video visit for your medical care today? Yes    Patient is located in home in [place]. Provider is located in medical office in [place], [place]. The patient's condition being treated/diagnosed is appropriate for telemedicine.     RD Recommendation        Candidacy for surgery?    RD Comments    Procedure Patient is pursuing VSG     NUTRITION ASSESSMENT - BARIATRIC SURGERY      Reason for Visit SWL 1/4     H&P      Past Medical History:   Diagnosis Date    CHF (congestive heart failure)     Constipation     Depression     Episodic mood disorder     Exercise-induced leg cramps     Hair loss     Heartburn     History of psychiatric hospitalization     Hypertension     Panic attacks     Pre-diabetes     PTSD (post-traumatic stress disorder)     Shortness of breath on exertion     Sleep apnea     NO MASK     Substance abuse     past and present per packet    Tired     Weight gain        Past Surgical History:   Procedure Laterality Date    CARDIAC CATHETERIZATION N/A 1/27/2021    Procedure: Left Heart Cath;  Surgeon: Darren Nice MD;  Location: [facility] CATH INVASIVE LOCATION;  Service: Cardiovascular;  Laterality: N/A;    ENDOSCOPY N/A 7/1/2021    Procedure: ESOPHAGOGASTRODUODENOSCOPY with biopsy;  Surgeon: Little Carver MD;  Location: [facility] ENDOSCOPY;  Service: General;  Laterality: N/A;  post : normal anatomy with biopsy    MOUTH SURGERY      TONSILLECTOMY  1993    WRIST SURGERY      lead from pencil removed from wrist        Previous Goals   Patient to eat and drink separately with 1 meal - working on  Patient to chew food well and make meals last 20-30 minutes - working on  Patient to aim  "for 48-64 oz each day - working on       Encounter Information        Visit Narrative     Patient reports he has been seeing a therapist and has been doing well. Patient has been working on eating and drinking separately. Patient does this most of the time. Patient has been trying to include soft foods into diet to prepare for diet advance. Patient has quit smoking and is working on quitting drinking.     Diet Recall:   Breakfast:  Lunch:  Dinner:  Snacks:  Beverages: water, skim milk, prune juice    Exercise: no current exercise    Supplements:    Self Monitoring:          Anthropometrics        Current Height, Weight Height: 185.4 cm (73\")  Weight: (!) 157 kg (346 lb) (11/13/24 1332)            Wt Readings from Last 30 Encounters:   11/13/24 1332 (!) 157 kg (346 lb)   08/30/24 1355 (!) 156 kg (343 lb)   07/27/24 2045 (!) 159 kg (350 lb)   06/18/24 1157 (!) 157 kg (347 lb)   05/03/24 1220 (!) 159 kg (350 lb)   04/29/24 1159 (!) 158 kg (348 lb)   02/20/24 0928 (!) 158 kg (348 lb 4.8 oz)   03/27/23 1335 (!) 158 kg (348 lb)   03/24/22 1317 (!) 163 kg (360 lb)   07/01/21 1328 (!) 159 kg (350 lb 8.5 oz)   06/25/21 1547 (!) 158 kg (348 lb)   05/19/21 2154 (!) 159 kg (350 lb)   04/16/21 0835 (!) 159 kg (350 lb)   03/11/21 2008 (!) 153 kg (338 lb)   03/11/21 1623 (!) 153 kg (338 lb)   02/03/21 1252 (!) 158 kg (348 lb 9.6 oz)   01/27/21 0828 (!) 157 kg (347 lb 3.6 oz)   12/21/20 1421 (!) 156 kg (345 lb)   12/01/20 1427 (!) 154 kg (339 lb)   11/04/20 1334 (!) 153 kg (338 lb)      BMI kg/m2 Body mass index is 45.65 kg/m².       Nutrition Diagnosis         Nutrition Dx Statement Overweight/obesity RT multifactorial biochemical, behavioral and environmental contributors to disease AEB BMI 45.65 kg/m^2         Nutrition Intervention         Nutrition Intervention Nutrition education related to diet modification and physical activity        Monitor/Evaluation        New Goals Patient to continue eating and drinking " separately  Patient to add in walking as tolerated  Patient to aim for 64oz of water each day       Total time spent with pt 15 minutes of which 15 minutes were spent on education.       Electronically signed by:  Casey Johnston RD  11/13/24 13:51 EST

## 2024-12-16 ENCOUNTER — TELEMEDICINE (OUTPATIENT)
Dept: BARIATRICS/WEIGHT MGMT | Facility: CLINIC | Age: 44
End: 2024-12-16
Payer: MEDICARE

## 2024-12-16 VITALS — WEIGHT: 315 LBS | HEIGHT: 73 IN | BODY MASS INDEX: 41.75 KG/M2

## 2024-12-16 DIAGNOSIS — E66.01 OBESITY, CLASS III, BMI 40-49.9 (MORBID OBESITY): Primary | ICD-10-CM

## 2024-12-16 NOTE — PROGRESS NOTES
Nutrition Services    Patient Name: Manish Castillo  YOB: 1980  MRN: [id number]  Date of Service: 12/16/24      ICD-10-CM ICD-9-CM   1. Obesity, Class III, BMI 40-49.9 (morbid obesity)  E66.01 278.01        You have chosen to receive care through a MyChart video visit. Do you consent to use a MyChart video visit for your medical care today? Yes    Patient is located in home in [place]. Provider is located in medical office in [place], [place]. The patient's condition being treated/diagnosed is appropriate for telemedicine.     RD Recommendation        Candidacy for surgery?    RD Comments    Procedure Patient is pursuing VSG     NUTRITION ASSESSMENT - BARIATRIC SURGERY      Reason for Visit SWL 2/4     H&P      Past Medical History:   Diagnosis Date    CHF (congestive heart failure)     Constipation     Depression     Episodic mood disorder     Exercise-induced leg cramps     Hair loss     Heartburn     History of psychiatric hospitalization     Hypertension     Panic attacks     Pre-diabetes     PTSD (post-traumatic stress disorder)     Shortness of breath on exertion     Sleep apnea     NO MASK     Substance abuse     past and present per packet    Tired     Weight gain        Past Surgical History:   Procedure Laterality Date    CARDIAC CATHETERIZATION N/A 1/27/2021    Procedure: Left Heart Cath;  Surgeon: Darren Nice MD;  Location: [facility] CATH INVASIVE LOCATION;  Service: Cardiovascular;  Laterality: N/A;    ENDOSCOPY N/A 7/1/2021    Procedure: ESOPHAGOGASTRODUODENOSCOPY with biopsy;  Surgeon: Little Carver MD;  Location: [facility] ENDOSCOPY;  Service: General;  Laterality: N/A;  post : normal anatomy with biopsy    MOUTH SURGERY      TONSILLECTOMY  1993    WRIST SURGERY      lead from pencil removed from wrist        Previous Goals   Patient to continue eating and drinking separately - met  Patient to add in walking as tolerated - met  Patient to aim for 64oz of water each day -  "met       Encounter Information        Visit Narrative     Patient states he has pulmonary consult in March. Patient states he will have his 6 months with psych done in April. Patient repots he has been eating and drinking separately. Patient to start drinking protein shakes closer to surgery.     Diet Recall:   Breakfast:  Lunch:  Dinner:  Snacks:  Beverages: patient is getting in 64oz of fluid each day    Exercise: patient has been walking as able. Patient would like to jog after surgery.     Supplements:    Self Monitoring:          Anthropometrics        Current Height, Weight Height: 185.4 cm (73\")  Weight: (!) 157 kg (346 lb) (12/16/24 1329)       Trending Weight Hx      Wt Readings from Last 30 Encounters:   12/16/24 1329 (!) 157 kg (346 lb)   11/13/24 1332 (!) 157 kg (346 lb)   08/30/24 1355 (!) 156 kg (343 lb)   07/27/24 2045 (!) 159 kg (350 lb)   06/18/24 1157 (!) 157 kg (347 lb)   05/03/24 1220 (!) 159 kg (350 lb)   04/29/24 1159 (!) 158 kg (348 lb)   02/20/24 0928 (!) 158 kg (348 lb 4.8 oz)   03/27/23 1335 (!) 158 kg (348 lb)   03/24/22 1317 (!) 163 kg (360 lb)   07/01/21 1328 (!) 159 kg (350 lb 8.5 oz)   06/25/21 1547 (!) 158 kg (348 lb)   05/19/21 2154 (!) 159 kg (350 lb)   04/16/21 0835 (!) 159 kg (350 lb)   03/11/21 2008 (!) 153 kg (338 lb)   03/11/21 1623 (!) 153 kg (338 lb)   02/03/21 1252 (!) 158 kg (348 lb 9.6 oz)   01/27/21 0828 (!) 157 kg (347 lb 3.6 oz)   12/21/20 1421 (!) 156 kg (345 lb)   12/01/20 1427 (!) 154 kg (339 lb)   11/04/20 1334 (!) 153 kg (338 lb)      BMI kg/m2 Body mass index is 45.65 kg/m².       Nutrition Diagnosis         Nutrition Dx Statement Overweight/obesity RT multifactorial biochemical, behavioral and environmental contributors to disease AEB BMI 45.65 kg/m^2         Nutrition Intervention         Nutrition Intervention Nutrition education related to diet modification and physical activity        Monitor/Evaluation        New Goals Patient to continue chewing food well " and eating slowly  Patient to continue eating and drinking separately  Patient to add in protein shakes when closer to surgery       Total time spent with pt 15 minutes of which 15 minutes were spent on education.       Electronically signed by:  Casey Johnston RD  12/16/24 14:26 EST

## 2025-01-13 ENCOUNTER — TELEMEDICINE (OUTPATIENT)
Dept: BARIATRICS/WEIGHT MGMT | Facility: CLINIC | Age: 45
End: 2025-01-13
Payer: MEDICARE

## 2025-01-13 VITALS — BODY MASS INDEX: 41.75 KG/M2 | HEIGHT: 73 IN | WEIGHT: 315 LBS

## 2025-01-13 DIAGNOSIS — E66.01 OBESITY, CLASS III, BMI 40-49.9 (MORBID OBESITY): Primary | ICD-10-CM

## 2025-01-13 DIAGNOSIS — Z71.3 NUTRITIONAL COUNSELING: ICD-10-CM

## 2025-01-13 NOTE — PROGRESS NOTES
[facility]G [facility] BARIATRIC SURGERY  [address] [place] [address]  [address] [place] [address] [place] [address]  Dept: [phone]  1/13/2025      Manish Castillo.  [id number]  [id number]  1980  male          You have chosen to receive care through a video visit. Do you consent to use a video visit for your medical care today? Yes    Location of patient: Home in [place]   Location of provider: Office in [place] ( [address] [place] [address], [place] [address])      Swl # 3/4      The patient is here for month 3/4 of their pre-operative physician supervised diet. He had a gain of 2 lbs. The patient states that he is following the recommendations given by our office and dietician including a high lean protein, low carb and low fat diet. We recommended adequate fruits and vegetable intake along with limited portion sizes. Patient is working on eliminating fast foods, fried foods, sweets and soda. Manish Castillo has been increasing his daily water intake. He has been exercising: walking some.  Wt Readings from Last 10 Encounters:   12/16/24 (!) 157 kg (346 lb)   11/13/24 (!) 157 kg (346 lb)   08/30/24 (!) 156 kg (343 lb)   07/27/24 (!) 159 kg (350 lb)   06/18/24 (!) 157 kg (347 lb)   05/03/24 (!) 159 kg (350 lb)   04/29/24 (!) 158 kg (348 lb)   02/20/24 (!) 158 kg (348 lb 4.8 oz)   03/27/23 (!) 158 kg (348 lb)   03/24/22 (!) 163 kg (360 lb)   Current weight: 348 pounds     Patient states they have made positive changes including cutting out bread and soda   The patient admits to be struggling with psych clearance     [facility] medical group - therapist and psychiatrist - will not have 6 months until April 2025   Miriam Beltranist therapist, DR. Carey - psychiatrist     Pulmonary - clearance scheduled for March     Breakfast: breakfast buffet ,   Other meals:  trying to cut out beef , eating more chicken and veggies, chili dogs minus buns   Snacks: popsicles  , trying to drink protein shakes   Drinks:skim milk , water , no caffeine   Exercise: walking to [facility] ( 20 minutes at a time),     Past goals:     Patient to continue chewing food well and eating slowly- partially met   Patient to continue eating and drinking separately- met   Patient to add in protein shakes when closer to surgery- not met yet     Review of Systems   Constitutional:  Positive for fatigue.   Respiratory: Negative.     Cardiovascular:         HTN   Gastrointestinal:  Positive for constipation.   Musculoskeletal: Negative.    Psychiatric/Behavioral:          PTSH           The following portions of the patient's history were reviewed and updated as appropriate: active problem list, medication list, allergies, social history, notes from last encounter  Past Medical History:   Diagnosis Date    CHF (congestive heart failure)     Constipation     Depression     Episodic mood disorder     Exercise-induced leg cramps     Hair loss     Heartburn     History of psychiatric hospitalization     Hypertension     Panic attacks     Pre-diabetes     PTSD (post-traumatic stress disorder)     Shortness of breath on exertion     Sleep apnea     NO MASK     Substance abuse     past and present per packet    Tired     Weight gain      Past Surgical History:   Procedure Laterality Date    CARDIAC CATHETERIZATION N/A 1/27/2021    Procedure: Left Heart Cath;  Surgeon: Darren Nice MD;  Location: [facility] CATH INVASIVE LOCATION;  Service: Cardiovascular;  Laterality: N/A;    ENDOSCOPY N/A 7/1/2021    Procedure: ESOPHAGOGASTRODUODENOSCOPY with biopsy;  Surgeon: Little Carver MD;  Location: [facility] ENDOSCOPY;  Service: General;  Laterality: N/A;  post : normal anatomy with biopsy    MOUTH SURGERY      TONSILLECTOMY  1993    WRIST SURGERY      lead from pencil removed from wrist        Physical Exam  Constitutional:       Appearance: Normal appearance. He is obese.   Cardiovascular:      Comments: Unable to  assess due to video visit   Pulmonary:      Comments: Unable to assess due to video visit   Abdominal:      Comments: Unable to assess due to video visit    Neurological:      General: No focal deficit present.      Mental Status: He is alert and oriented to person, place, and time.   Psychiatric:         Mood and Affect: Mood normal.         Behavior: Behavior normal.         Thought Content: Thought content normal.         Judgment: Judgment normal.       Height 73 inches, weight 348 pounds, BMI 45.91      Discussion/Plan:    New goals:   Continue meeting with psychiatrist/ counselor  Try protein shakes closer to time of surgery   Continue with no carbonation, limiting carbs to under 100/ day   Continue with no THC/ marijuana     Obesity/Morbid Obesity: Currently the patient's weight is increased. There are no medications prescribed.Treatment plan includes prescribed diet, prescribed exercise regimen and behavior modification.    I reviewed the appropriate dietary choices with the patient and encouraged the necessary changes. Recommended at least 70 grams of protein per day, around 35 grams of fats and less than 100 grams of carbohydrates. Reviewed calorie intake if patient wanted to calorie count and/or had BMR. Instructed patient to drink half of body weight in ounces per day and exercise a minimum of 150 minutes per week including both cardio and strength training. Discussed the option of keeping a food journal which will help patient become more aware of the nutritional value of foods so they are more prepared after surgery.    The patient was given written materials from our office for education.   I answered all of the patients questions regarding dietary changes, exercise or surgical options.Patient has chosen interest in SURGERYOPTIONS : GASTRIC SLEEVE  The patient will follow up in 1 month. The total time spent face to face was 20 minutes with 15 minutes spent counseling.        Nuzhat Barksdale,  APRN  1/13/2025

## 2025-01-23 ENCOUNTER — TELEPHONE (OUTPATIENT)
Dept: BARIATRICS/WEIGHT MGMT | Facility: CLINIC | Age: 45
End: 2025-01-23
Payer: MEDICARE

## 2025-01-23 NOTE — TELEPHONE ENCOUNTER
"Pt called regarding medication \"starting with \"l\" from his psychiatrist/would it interfere with his surgery? Please advise    "

## 2025-01-23 NOTE — TELEPHONE ENCOUNTER
Pt states  WITH [facility], wants PT on NEW MEDICATION. Possibly Lexapro or Lamotrigine.   Pt states he would let us know. He sees Dr. rPingle next month for a f/u.         MESSAGE SENT TO JOSUÉ BAUTISTA.

## 2025-01-23 NOTE — TELEPHONE ENCOUNTER
I believe he is interested in gastric sleeve. He would be ok starting on psychiatric medications like lexapro or lamotrigine.

## 2025-02-03 ENCOUNTER — TELEPHONE (OUTPATIENT)
Dept: BARIATRICS/WEIGHT MGMT | Facility: CLINIC | Age: 45
End: 2025-02-03
Payer: MEDICARE

## 2025-02-03 ENCOUNTER — TELEMEDICINE (OUTPATIENT)
Dept: BARIATRICS/WEIGHT MGMT | Facility: CLINIC | Age: 45
End: 2025-02-03
Payer: MEDICARE

## 2025-02-03 VITALS — WEIGHT: 315 LBS | BODY MASS INDEX: 41.75 KG/M2 | HEIGHT: 73 IN

## 2025-02-03 DIAGNOSIS — E66.01 OBESITY, CLASS III, BMI 40-49.9 (MORBID OBESITY): Primary | ICD-10-CM

## 2025-02-03 DIAGNOSIS — Z71.3 NUTRITIONAL COUNSELING: ICD-10-CM

## 2025-02-03 PROCEDURE — 1159F MED LIST DOCD IN RCRD: CPT | Performed by: NURSE PRACTITIONER

## 2025-02-03 PROCEDURE — 99213 OFFICE O/P EST LOW 20 MIN: CPT | Performed by: NURSE PRACTITIONER

## 2025-02-03 PROCEDURE — 1160F RVW MEDS BY RX/DR IN RCRD: CPT | Performed by: NURSE PRACTITIONER

## 2025-02-03 NOTE — PROGRESS NOTES
[facility]K BAR SURG [facility] BARIATRIC SURGERY  [address] [place] [address]  [address] [place] [address]  Dept: [phone]  2/3/2025      Manish Castillo.  [id number]  [id number]  1980  male          You have chosen to receive care through a video visit. Do you consent to use a video visit for your medical care today? Yes    Location of patient: Home in [place]   Location of provider: Office in [place] ( [address] [place] [address], [place] [address])      Current weight 350 pounds    The patient is here for month 4/4 of their pre-operative physician supervised diet. He had a gain of 2 lbs. The patient states that he is following the recommendations given by our office and dietician including a high lean protein, low carb and low fat diet. We recommended adequate fruits and vegetable intake along with limited portion sizes. Patient is working on eliminating fast foods, fried foods, sweets and soda. Manish Castillo has been increasing his daily water intake. He has been exercising: walking .  Wt Readings from Last 10 Encounters:   01/13/25 (!) 158 kg (348 lb)   12/16/24 (!) 157 kg (346 lb)   11/13/24 (!) 157 kg (346 lb)   08/30/24 (!) 156 kg (343 lb)   07/27/24 (!) 159 kg (350 lb)   06/18/24 (!) 157 kg (347 lb)   05/03/24 (!) 159 kg (350 lb)   04/29/24 (!) 158 kg (348 lb)   02/20/24 (!) 158 kg (348 lb 4.8 oz)   03/27/23 (!) 158 kg (348 lb)     Patient states they have made positive changes including no THC, walking more   The patient admits to be struggling with pulmonary and psych clearances     Breakfast: sleep in late   2-4 pm - varies , hamburger, mexican , [employer],   Skittles craving last night ( rarely do  this)  Evening tumbleweed, eating out some   Drinks: beer , water, skim milk, prune juice, minimal soda   Exercise: walking a lot , flea marketing     Past goals:  Continue meeting with psychiatrist/ counselor- met 6 months will be in  April   Try protein shakes closer to time of surgery- not met yet    Continue with no carbonation, limiting carbs to under 100/ day - partially met, weaning off beer  Continue with no THC/ marijuana - met     Review of Systems   Constitutional:  Positive for activity change, appetite change and fatigue.   Respiratory: Negative.     Cardiovascular: Negative.    Gastrointestinal: Negative.    Musculoskeletal: Negative.      Height 73 inches, weight 350 pounds, BMI 46.18    The following portions of the patient's history were reviewed and updated as appropriate: active problem list, medication list, allergies, social history, notes from last encounter  Past Medical History:   Diagnosis Date    CHF (congestive heart failure)     Constipation     Depression     Episodic mood disorder     Exercise-induced leg cramps     Hair loss     Heartburn     History of psychiatric hospitalization     Hypertension     Panic attacks     Pre-diabetes     PTSD (post-traumatic stress disorder)     Shortness of breath on exertion     Sleep apnea     NO MASK     Substance abuse     past and present per packet    Tired     Weight gain      Past Surgical History:   Procedure Laterality Date    CARDIAC CATHETERIZATION N/A 1/27/2021    Procedure: Left Heart Cath;  Surgeon: Darren Nice MD;  Location: [facility] CATH INVASIVE LOCATION;  Service: Cardiovascular;  Laterality: N/A;    ENDOSCOPY N/A 7/1/2021    Procedure: ESOPHAGOGASTRODUODENOSCOPY with biopsy;  Surgeon: Little Carver MD;  Location: [facility] ENDOSCOPY;  Service: General;  Laterality: N/A;  post : normal anatomy with biopsy    MOUTH SURGERY      TONSILLECTOMY  1993    WRIST SURGERY      lead from pencil removed from wrist        Physical Exam  Constitutional:       Appearance: Normal appearance. He is obese.   Cardiovascular:      Comments: Unable to assess due to video visit   Pulmonary:      Comments: Unable to assess due to video visit   Abdominal:      Comments: Unable to  assess due to video visit    Neurological:      General: No focal deficit present.      Mental Status: He is alert and oriented to person, place, and time.   Psychiatric:         Mood and Affect: Mood normal.         Behavior: Behavior normal.         Thought Content: Thought content normal.         Judgment: Judgment normal.             Discussion/Plan:    New goals:  Try 1 protein shake a day  Continue walking 20 minutes 2-3 days a week  Wear cpap consistently   Pulmonary and psych apts / clearances scheduled for April     Obesity/Morbid Obesity: Currently the patient's weight is increased. There are no medications prescribed.Treatment plan includes prescribed diet, prescribed exercise regimen and behavior modification.    I reviewed the appropriate dietary choices with the patient and encouraged the necessary changes. Recommended at least 70 grams of protein per day, around 35 grams of fats and less than 100 grams of carbohydrates. Reviewed calorie intake if patient wanted to calorie count and/or had BMR. Instructed patient to drink half of body weight in ounces per day and exercise a minimum of 150 minutes per week including both cardio and strength training. Discussed the option of keeping a food journal which will help patient become more aware of the nutritional value of foods so they are more prepared after surgery.    The patient was given written materials from our office for education.   I answered all of the patients questions regarding dietary changes, exercise or surgical options.Patient has chosen interest in SURGERYOPTIONS : GASTRIC SLEEVE  The patient will follow up in 1 month. He would like to continue with SWL for 1 more month. He states he will not be eligable for pulmonary and psychiatric clearances until at least April. The total time spent face to face was 20 minutes with 15 minutes spent counseling.        Nuzhat Barksdale, APRN  2/3/2025

## 2025-02-03 NOTE — TELEPHONE ENCOUNTER
Pt is going to have to delay pulmonary appt due to needing to go the hospital for reevaluation for sleep study due to not getting in the correct amount of hours for CPAP machine, just an FYI per pt

## 2025-02-18 ENCOUNTER — TELEPHONE (OUTPATIENT)
Dept: BARIATRICS/WEIGHT MGMT | Facility: CLINIC | Age: 45
End: 2025-02-18
Payer: MEDICARE

## 2025-02-18 NOTE — TELEPHONE ENCOUNTER
Called and spoke with patient. States has to do an overnight sleep study at the hospital due to didn't use cpap machine for more than 4 hours a night so has to return it. In order to get cpap back, has to do a sleep study to qualify again. States may be April or May until gets everything done in order to move forward with surgery.

## 2025-02-18 NOTE — TELEPHONE ENCOUNTER
Delay of sleep study due to no transportation for original date, appt was rescheduled for next week. Please return call to pt to let him know if this will affect his surgery date

## 2025-03-05 ENCOUNTER — TELEMEDICINE (OUTPATIENT)
Dept: BARIATRICS/WEIGHT MGMT | Facility: CLINIC | Age: 45
End: 2025-03-05
Payer: MEDICARE

## 2025-03-05 VITALS — BODY MASS INDEX: 41.75 KG/M2 | WEIGHT: 315 LBS | HEIGHT: 73 IN

## 2025-03-05 DIAGNOSIS — E66.01 OBESITY, CLASS III, BMI 40-49.9 (MORBID OBESITY): Primary | ICD-10-CM

## 2025-03-05 RX ORDER — GABAPENTIN 400 MG/1
400 CAPSULE ORAL
COMMUNITY
Start: 2025-03-04

## 2025-03-05 RX ORDER — LANOLIN ALCOHOL/MO/W.PET/CERES
50 CREAM (GRAM) TOPICAL DAILY
COMMUNITY

## 2025-03-05 NOTE — PROGRESS NOTES
Nutrition Services    Patient Name: Manish Castillo  YOB: 1980  MRN: [id number]  Date of Service: 03/05/25      ICD-10-CM ICD-9-CM   1. Obesity, Class III, BMI 40-49.9 (morbid obesity)  E66.01 278.01        You have chosen to receive care through a MyChart video visit. Do you consent to use a MyChart video visit for your medical care today? Yes    Patient is located in home in [place]. Provider is located in medical office in [place], [place]. The patient's condition being treated/diagnosed is appropriate for telemedicine.     RD Recommendation        Candidacy for surgery?    RD Comments    Procedure Patient is pursuing VSG     NUTRITION ASSESSMENT - BARIATRIC SURGERY      Reason for Visit SW 5/4, continued for insurance     H&P      Past Medical History:   Diagnosis Date    CHF (congestive heart failure)     Constipation     Depression     Episodic mood disorder     Exercise-induced leg cramps     Hair loss     Heartburn     History of psychiatric hospitalization     Hypertension     Panic attacks     Pre-diabetes     PTSD (post-traumatic stress disorder)     Shortness of breath on exertion     Sleep apnea     NO MASK     Substance abuse     past and present per packet    Tired     Weight gain        Past Surgical History:   Procedure Laterality Date    CARDIAC CATHETERIZATION N/A 1/27/2021    Procedure: Left Heart Cath;  Surgeon: Darren Nice MD;  Location: [facility] CATH INVASIVE LOCATION;  Service: Cardiovascular;  Laterality: N/A;    ENDOSCOPY N/A 7/1/2021    Procedure: ESOPHAGOGASTRODUODENOSCOPY with biopsy;  Surgeon: Little Carver MD;  Location: [facility] ENDOSCOPY;  Service: General;  Laterality: N/A;  post : normal anatomy with biopsy    MOUTH SURGERY      TONSILLECTOMY  1993    WRIST SURGERY      lead from pencil removed from wrist        Previous Goals          Encounter Information        Visit Narrative     Patient reports he goes for sleep study in 1 week. Patient states he  "then has to wear his CPAP for a month prior to seeing pulmonary. Patient also states he has 2 more visit with [facility] Psychology. Patient has been working on all goals. Patient has continued eating slowly and eating and drinking separately. Patient has been walking as able but states his back is hurting worse than before.     Patient encouraged to continue SWL for insurance since we are unsure when he will be ready for surgery. Patient to make appt for next month.     Diet Recall:   Breakfast:  Lunch:  Dinner:  Snacks:  Beverages:    Exercise: walking as tolerated     Supplements:    Self Monitoring:          Anthropometrics        Current Height, Weight Height: 185.4 cm (73\")  Weight: (!) 160 kg (353 lb) (03/05/25 1032)       Trending Weight Hx      Wt Readings from Last 30 Encounters:   03/05/25 1032 (!) 160 kg (353 lb)   02/03/25 1357 (!) 159 kg (350 lb)   01/13/25 1355 (!) 158 kg (348 lb)   12/16/24 1329 (!) 157 kg (346 lb)   11/13/24 1332 (!) 157 kg (346 lb)   08/30/24 1355 (!) 156 kg (343 lb)   07/27/24 2045 (!) 159 kg (350 lb)   06/18/24 1157 (!) 157 kg (347 lb)   05/03/24 1220 (!) 159 kg (350 lb)   04/29/24 1159 (!) 158 kg (348 lb)   02/20/24 0928 (!) 158 kg (348 lb 4.8 oz)   03/27/23 1335 (!) 158 kg (348 lb)   03/24/22 1317 (!) 163 kg (360 lb)   07/01/21 1328 (!) 159 kg (350 lb 8.5 oz)   06/25/21 1547 (!) 158 kg (348 lb)   05/19/21 2154 (!) 159 kg (350 lb)   04/16/21 0835 (!) 159 kg (350 lb)   03/11/21 2008 (!) 153 kg (338 lb)   03/11/21 1623 (!) 153 kg (338 lb)   02/03/21 1252 (!) 158 kg (348 lb 9.6 oz)   01/27/21 0828 (!) 157 kg (347 lb 3.6 oz)   12/21/20 1421 (!) 156 kg (345 lb)   12/01/20 1427 (!) 154 kg (339 lb)   11/04/20 1334 (!) 153 kg (338 lb)      BMI kg/m2 Body mass index is 46.57 kg/m².       Nutrition Diagnosis         Nutrition Dx Statement Overweight/obesity RT multifactorial biochemical, behavioral and environmental contributors to disease AEB BMI 46.57 kg/m^2         Nutrition " Intervention         Nutrition Intervention Nutrition education related to diet modification and physical activity        Monitor/Evaluation        New Goals Patient to continue eating slowly and chewing food well  Patient to continue eating and drinking separately  Patient to continue walking as tolerated       Total time spent with pt 15 minutes of which 15 minutes were spent on education.       Electronically signed by:  Casey Johnston RD  03/05/25 13:19 EST

## 2025-03-26 ENCOUNTER — TELEPHONE (OUTPATIENT)
Dept: BARIATRICS/WEIGHT MGMT | Facility: CLINIC | Age: 45
End: 2025-03-26
Payer: MEDICARE

## 2025-03-26 NOTE — TELEPHONE ENCOUNTER
Pt has questions regarding prescription for sleep medication/per sleep study he has been diagnosed with insomnia. Pt questioning if he can take medication for this to interfere with gs sx

## 2025-04-04 ENCOUNTER — TELEMEDICINE (OUTPATIENT)
Dept: BARIATRICS/WEIGHT MGMT | Facility: CLINIC | Age: 45
End: 2025-04-04
Payer: MEDICARE

## 2025-04-04 VITALS — WEIGHT: 315 LBS | BODY MASS INDEX: 41.75 KG/M2 | HEIGHT: 73 IN

## 2025-04-04 DIAGNOSIS — Z71.3 NUTRITIONAL COUNSELING: ICD-10-CM

## 2025-04-04 DIAGNOSIS — E66.01 OBESITY, CLASS III, BMI 40-49.9 (MORBID OBESITY): Primary | ICD-10-CM

## 2025-04-04 NOTE — PROGRESS NOTES
MGK BAR SURG [facility] BARIATRIC SURGERY  [address] [place] [address]  [address] [place] [address]  Dept: [phone]  4/4/2025      Manish Castillo.  [id number]  [id number]  1980  male        You have chosen to receive care through a video visit. Do you consent to use a video visit for your medical care today? Yes    Location of patient: Home in [place]   Location of provider: Office in [place] ( [address] [place] [address], [place] [address])    Continued Swl 6/4    Current weight: 352 pounds     May 3rd - last day using CPAP consecutive   May 13th - last visit with psychiatrist and therapist       The patient is here for month 6/4 of their pre-operative physician supervised diet. He had a loss of 1 lbs. The patient states that he is following the recommendations given by our office and dietician including a high lean protein, low carb and low fat diet. We recommended adequate fruits and vegetable intake along with limited portion sizes. Patient is working on eliminating fast foods, fried foods, sweets and soda. Manish Castillo has been increasing his daily water intake. He has been exercising:  walking some.  Wt Readings from Last 10 Encounters:   03/05/25 (!) 160 kg (353 lb)   02/03/25 (!) 159 kg (350 lb)   01/13/25 (!) 158 kg (348 lb)   12/16/24 (!) 157 kg (346 lb)   11/13/24 (!) 157 kg (346 lb)   08/30/24 (!) 156 kg (343 lb)   07/27/24 (!) 159 kg (350 lb)   06/18/24 (!) 157 kg (347 lb)   05/03/24 (!) 159 kg (350 lb)   04/29/24 (!) 158 kg (348 lb)     Patient states they have made positive changes including no marijuana   The patient admits to be struggling with marijuana cravings     Breakfast: m[employer] - breakfast sandwich   Lunch: minimal recently , soups   Dinner: mexican foods   Snacks: trying not eating past 6 pm   Drinks: prune juice, water, beer prn , skim milk  Exercise: walking to the store , swimming in hot tub    Past  goals:  Patient to continue eating slowly and chewing food well- met   Patient to continue eating and drinking separately- met   Patient to continue walking as tolerated- met       Review of Systems   Constitutional: Negative.    Respiratory: Negative.     Cardiovascular:         DR. Nice, cardiologist- yearly visit June    Gastrointestinal: Negative.    Musculoskeletal:  Positive for back pain and myalgias.   Psychiatric/Behavioral:          Anixety          The following portions of the patient's history were reviewed and updated as appropriate: active problem list, medication list, allergies, social history, notes from last encounter  Past Medical History:   Diagnosis Date    CHF (congestive heart failure)     Constipation     Depression     Episodic mood disorder     Exercise-induced leg cramps     Hair loss     Heartburn     History of psychiatric hospitalization     Hypertension     Panic attacks     Pre-diabetes     PTSD (post-traumatic stress disorder)     Shortness of breath on exertion     Sleep apnea     NO MASK     Substance abuse     past and present per packet    Tired     Weight gain      Past Surgical History:   Procedure Laterality Date    CARDIAC CATHETERIZATION N/A 1/27/2021    Procedure: Left Heart Cath;  Surgeon: Darren Nice MD;  Location: [facility] CATH INVASIVE LOCATION;  Service: Cardiovascular;  Laterality: N/A;    ENDOSCOPY N/A 7/1/2021    Procedure: ESOPHAGOGASTRODUODENOSCOPY with biopsy;  Surgeon: Little Carver MD;  Location: [facility] ENDOSCOPY;  Service: General;  Laterality: N/A;  post : normal anatomy with biopsy    MOUTH SURGERY      TONSILLECTOMY  1993    WRIST SURGERY      lead from pencil removed from wrist        Physical Exam  Constitutional:       Appearance: Normal appearance. He is obese.   Cardiovascular:      Comments: Unable to assess due to video visit   Pulmonary:      Comments: Unable to assess due to video visit   Abdominal:      Comments: Unable to assess  due to video visit    Neurological:      General: No focal deficit present.      Mental Status: He is alert and oriented to person, place, and time.   Psychiatric:         Mood and Affect: Mood normal.         Behavior: Behavior normal.         Thought Content: Thought content normal.         Judgment: Judgment normal.         Height 73 inches, weight 352 pounds, BMI 46.44    Discussion/Plan:      New goals:   compliance with CPAP for 30 days- needs pulmonary clearance before surgery  Psychiatric clearance, continue to meet with therapist/ psychiatrist post op     Obesity/Morbid Obesity: Currently the patient's weight is decreased. There are no medications prescribed.Treatment plan includes prescribed diet, prescribed exercise regimen and behavior modification.    I reviewed the appropriate dietary choices with the patient and encouraged the necessary changes. Recommended at least 70 grams of protein per day, around 35 grams of fats and less than 100 grams of carbohydrates. Reviewed calorie intake if patient wanted to calorie count and/or had BMR. Instructed patient to drink half of body weight in ounces per day and exercise a minimum of 150 minutes per week including both cardio and strength training. Discussed the option of keeping a food journal which will help patient become more aware of the nutritional value of foods so they are more prepared after surgery.    The patient was given written materials from our office for education.   I answered all of the patients questions regarding dietary changes, exercise or surgical options.  The patient will follow up for pre op if cleared by psych and pulmonary provider. The total time spent face to face was 20 minutes with 15 minutes spent counseling.      Nuzhat Barksdale, APRN  4/4/2025

## 2025-05-06 ENCOUNTER — TELEPHONE (OUTPATIENT)
Dept: CARDIOLOGY | Facility: CLINIC | Age: 45
End: 2025-05-06

## 2025-05-06 NOTE — TELEPHONE ENCOUNTER
CALLED PATIENT. MOVED APPT TO 5/27 WITH [facility].    THERE ARE NO ACTIVE LAB ORDERS IN CHART. PLEASE CALL PATIENT IF HE NEEDS LABS PRIOR TO APPT.

## 2025-05-06 NOTE — TELEPHONE ENCOUNTER
Caller: Manish Castillo    Relationship to patient: Self    Best call back number: [phone]    Chief complaint: WANTS TO GET IN SOONER SO HE CAN GET A CARDIAC CLEARANCE FOR HIS WEIGHT LOSS SURGERY. PT ALSO WANTS TO KNOW IF HE NEEDS ANY BLOOD WORK BEFORE HIS APPOINTMENT.    Type of visit: FOLLOW UP     Requested date: NEAR END OF MAY    If rescheduling, when is the original appointment: 6.23.25    Additional notes: CANNOT DO 12-16 OF MAY

## 2025-05-07 ENCOUNTER — TELEPHONE (OUTPATIENT)
Dept: BARIATRICS/WEIGHT MGMT | Facility: CLINIC | Age: 45
End: 2025-05-07
Payer: MEDICARE

## 2025-05-07 DIAGNOSIS — I10 ESSENTIAL HYPERTENSION: ICD-10-CM

## 2025-05-07 DIAGNOSIS — R06.09 DYSPNEA ON EXERTION: Primary | ICD-10-CM

## 2025-05-07 DIAGNOSIS — I25.10 CORONARY ARTERY DISEASE INVOLVING NATIVE CORONARY ARTERY OF NATIVE HEART WITHOUT ANGINA PECTORIS: ICD-10-CM

## 2025-05-23 ENCOUNTER — CONSULT (OUTPATIENT)
Dept: BARIATRICS/WEIGHT MGMT | Facility: CLINIC | Age: 45
End: 2025-05-23
Payer: MEDICARE

## 2025-05-23 ENCOUNTER — LAB (OUTPATIENT)
Dept: LAB | Facility: HOSPITAL | Age: 45
End: 2025-05-23
Payer: MEDICARE

## 2025-05-23 VITALS
BODY MASS INDEX: 41.75 KG/M2 | HEART RATE: 78 BPM | DIASTOLIC BLOOD PRESSURE: 93 MMHG | SYSTOLIC BLOOD PRESSURE: 130 MMHG | HEIGHT: 73 IN | RESPIRATION RATE: 18 BRPM | OXYGEN SATURATION: 98 % | WEIGHT: 315 LBS

## 2025-05-23 DIAGNOSIS — E66.01 OBESITY, CLASS III, BMI 40-49.9 (MORBID OBESITY): Primary | ICD-10-CM

## 2025-05-23 LAB
ALBUMIN SERPL-MCNC: 4.4 G/DL (ref 3.5–5.2)
ALBUMIN/GLOB SERPL: 1.3 G/DL
ALP SERPL-CCNC: 56 U/L (ref 39–117)
ALT SERPL W P-5'-P-CCNC: 173 U/L (ref 1–41)
ANION GAP SERPL CALCULATED.3IONS-SCNC: 13 MMOL/L (ref 5–15)
AST SERPL-CCNC: 71 U/L (ref 1–40)
BILIRUB SERPL-MCNC: 0.5 MG/DL (ref 0–1.2)
BUN SERPL-MCNC: 23 MG/DL (ref 6–20)
BUN/CREAT SERPL: 20.5 (ref 7–25)
CALCIUM SPEC-SCNC: 9.8 MG/DL (ref 8.6–10.5)
CHLORIDE SERPL-SCNC: 102 MMOL/L (ref 98–107)
CHOLEST SERPL-MCNC: 236 MG/DL (ref 0–200)
CO2 SERPL-SCNC: 24 MMOL/L (ref 22–29)
CREAT SERPL-MCNC: 1.12 MG/DL (ref 0.76–1.27)
EGFRCR SERPLBLD CKD-EPI 2021: 84.1 ML/MIN/1.73
GLOBULIN UR ELPH-MCNC: 3.3 GM/DL
GLUCOSE SERPL-MCNC: 105 MG/DL (ref 65–99)
HDLC SERPL-MCNC: 50 MG/DL (ref 40–60)
LDLC SERPL CALC-MCNC: 141 MG/DL (ref 0–100)
LDLC/HDLC SERPL: 2.72 {RATIO}
POTASSIUM SERPL-SCNC: 4.6 MMOL/L (ref 3.5–5.2)
PROT SERPL-MCNC: 7.7 G/DL (ref 6–8.5)
SODIUM SERPL-SCNC: 139 MMOL/L (ref 136–145)
TRIGL SERPL-MCNC: 250 MG/DL (ref 0–150)
VLDLC SERPL-MCNC: 45 MG/DL (ref 5–40)

## 2025-05-23 PROCEDURE — 80061 LIPID PANEL: CPT | Performed by: INTERNAL MEDICINE

## 2025-05-23 PROCEDURE — 80053 COMPREHEN METABOLIC PANEL: CPT | Performed by: INTERNAL MEDICINE

## 2025-05-23 NOTE — PROGRESS NOTES
Bariatric Consult:    Chief Complaint: Morbid Obesity  Referred by Darren Nice MD    Manish Castillo is here today for consult on Morbid Obesity    History of Present Illness:     Manish Castillo is a 44 y.o. male with Class 3 obesity with co-morbidities including  has a past medical history of CHF (congestive heart failure), Constipation, Depression, Episodic mood disorder, Exercise-induced leg cramps, Hair loss, Heartburn, History of psychiatric hospitalization, Hypertension, Panic attacks, Pre-diabetes, PTSD (post-traumatic stress disorder), Shortness of breath on exertion, Sleep apnea, Substance abuse, Tired, and Weight gain.  who presents for surgical consultation for the above procedure. Manish has completed the initial intake visit and has been examined by our nurse practitioner, dietician, psychologist and underwent the extensive educational teaching process under the guidance of our bariatric coordinator and myself. Manish also has seen the educational video SABA on the surgical procedure if available. Manish attended today more educational teaching from our bariatric coordinator and myself. Manish has had an extensive medical workup including a visit with their primary care physician, EKG, chest radiograph, blood work, EGD or UGI and possibly further testing. These have been reviewed by me and discussed with the patient. Manish is now ready to proceed with surgery. Manish presently denies nausea, vomiting, fever, chills, chest pain, shortness of air, melena, hematochezia, hemetemesis, dysuria, frequency, hematuria, jaundice or abdominal pain.     Wt Readings from Last 10 Encounters:   05/23/25 (!) 160 kg (352 lb 3.2 oz)   04/04/25 (!) 160 kg (352 lb)   03/05/25 (!) 160 kg (353 lb)   02/03/25 (!) 159 kg (350 lb)   01/13/25 (!) 158 kg (348 lb)   12/16/24 (!) 157 kg (346 lb)   11/13/24 (!) 157 kg (346 lb)   08/30/24 (!) 156 kg (343 lb)   07/27/24 (!) 159 kg (350 lb)   06/18/24 (!) 157 kg  (347 lb)       The  pre-op EGD shows   normal    Past Medical History:   Diagnosis Date    CHF (congestive heart failure)     Constipation     Depression     Episodic mood disorder     Exercise-induced leg cramps     Hair loss     Heartburn     History of psychiatric hospitalization     Hypertension     Panic attacks     Pre-diabetes     PTSD (post-traumatic stress disorder)     Shortness of breath on exertion     Sleep apnea     NO MASK     Substance abuse     past and present per packet    Tired     Weight gain        No diagnosis found.    Past Surgical History:   Procedure Laterality Date    CARDIAC CATHETERIZATION N/A 1/27/2021    Procedure: Left Heart Cath;  Surgeon: Darren Nice MD;  Location: [facility] CATH INVASIVE LOCATION;  Service: Cardiovascular;  Laterality: N/A;    ENDOSCOPY N/A 7/1/2021    Procedure: ESOPHAGOGASTRODUODENOSCOPY with biopsy;  Surgeon: Little Carver MD;  Location: [facility] ENDOSCOPY;  Service: General;  Laterality: N/A;  post : normal anatomy with biopsy    MOUTH SURGERY      TONSILLECTOMY  1993    WRIST SURGERY      lead from pencil removed from wrist         * No active hospital problems. *      Allergies   Allergen Reactions    Aspirin Other (See Comments)     Must be coated asprin.          Current Outpatient Medications:     aspirin (aspirin) 81 MG EC tablet, Take 1 tablet by mouth Daily., Disp: 100 tablet, Rfl: 3    hydroCHLOROthiazide 25 MG tablet, Take 1 tablet by mouth Daily., Disp: 90 tablet, Rfl: 3    losartan (Cozaar) 50 MG tablet, Take 1 tablet by mouth Daily., Disp: 90 tablet, Rfl: 3    metoprolol succinate XL (TOPROL-XL) 50 MG 24 hr tablet, Take 1 tablet by mouth Daily., Disp: 90 tablet, Rfl: 3    Multiple Vitamins-Minerals (MULTI ADULT GUMMIES PO), Take  by mouth., Disp: , Rfl:     Omega-3 Fatty Acids (Fish Oil) 435 MG capsule, Take  by mouth., Disp: , Rfl:     Plant Sterols and Stanols (CHOLEST OFF PO), Take  by mouth., Disp: , Rfl:     thiamine (VITAMIN B-1) 100  MG tablet  tablet, Take 1 tablet by mouth Daily., Disp: , Rfl:     vitamin B-6 (PYRIDOXINE) 50 MG tablet, Take 1 tablet by mouth Daily., Disp: , Rfl:     Social History     Socioeconomic History    Marital status: Single   Tobacco Use    Smoking status: Former     Current packs/day: 0.00     Types: Cigarettes     Quit date:      Years since quittin.4     Passive exposure: Past    Smokeless tobacco: Never   Vaping Use    Vaping status: Former    Substances: Nicotine, THC   Substance and Sexual Activity    Alcohol use: Yes     Alcohol/week: 12.0 standard drinks of alcohol     Types: 12 Cans of beer per week     Comment: daily    Drug use: Not Currently     Frequency: 7.0 times per week     Types: Marijuana     Comment: stopped for sx 24    Sexual activity: Defer       Family History   Problem Relation Age of Onset    Heart disease Mother     Hypertension Mother     Sleep apnea Mother     Diabetes Paternal Grandmother        Review of Systems:  Review of Systems    Review of Systems   Constitutional: Negative.    HENT: Negative.    Eyes: Negative.    Respiratory: Negative.    Cardiovascular: Negative.    Gastrointestinal: Negative.    Endocrine: Negative.    Genitourinary: Negative.    Musculoskeletal: Negative.    Skin: Negative.    Allergic/Immunologic: Negative.    Neurological: Negative.    Hematological: Negative.    Psychiatric/Behavioral: Negative.      Physical Exam:  Body mass index is 46.47 kg/m².   Vital Signs:  Weight: (!) 160 kg (352 lb 3.2 oz)   Body mass index is 46.47 kg/m².  Temp: (not recorded)   Heart Rate: 78   BP: 130/93     Awake and alert  Normal mental status  Normal pulmonary effort  Abdomen appropriate tenderness  Incisions no erythema  Extremities no tenderness or swelling      Assessment:    Manish Castillo is a 44 y.o. year old male with class 3 obesity with a BMI of Body mass index is 46.47 kg/m². and multiple co-morbidities listed in the encounter diagnosis.    I think he  is an appropriate candidate for this surgery, and is ready to proceed.      The patient has returned to the office for a surgical consultation and has requested to proceed with a laparoscopic gastric sleeve.  I have had the opportunity to obtain a history, examine the patient and review the patient's chart.    The patient understands that surgery is a tool and that weight loss is not guaranteed but only seen in the context of appropriate use, regular follow up, exercise and making appropriate food choices.     I personally discussed the potential complications of the laparoscopic gastric sleeve with this patient.  The patient is well aware of potential complications of the surgery that include but not limited to bleeding, infections, deep vein thrombosis, pulmonary embolism, pulmonary complications such as pneumonia, cardiac event, hernias, small bowel obstruction, damage to the spleen or other organs, bowel injury, disfiguring scars, failure to lose weight, need for additional surgery, conversion to an open procedure and death.  The patient is also aware of complications which apply in particular to the gastric sleeve and can include but not limited to the leakage of gastric contents at the staple line, the development of an intra-abdominal abscess, gastroesophageal reflux disease, Lee's esophagus, ulcers, vitamin/mineral deficiencies, strictures, and the possibility of converting this procedure to a Jane-en-Y gastric bypass. The patient also understands the possibility of requiring an acid reducer medication for the rest of their life.    The risks, benefits, potential complications and alternative therapies were discussed at great length as outlined in our extensive consent forms, online consent and educational teaching processes.    The patient has confirmed the participation in the programs extensive educational activities.    All questions and concerns were answered to patient's satisfaction.  The patient  now wishes to proceed with surgery.    Patient has [default value] the pre-operative insertion of an IVC filter.     The patient has [default value] a postoperative course of anitcoagulant therapy.      Plan/Discussion/Summary:        I instructed patient to start on a H2 blocker or proton pump inhibitor if not already on one of these medications.    I explained in detail the procedures that we perform.  All of these procedures have a chance to convert to open if any technical challenges or complications do occur.  Bariatric surgery is not cosmetic surgery but rather a tool to help a patient make a life-long commitment lifestyle change including diet, exercise, behavior changes, and taking supplemental vitamins and minerals.    Problems after surgery may require more operations to correct them.    The risks, benefits, alternatives, and potential complications of all of the procedures were explained in detail including, but not limited to death, anesthesia and medication adverse effect, deep venous thrombosis, pulmonary embolism, trocar site/incisional hernia, wound infection, abdominal infection, bleeding, failure to lose weight, gain weight, a change in body image, metabolic complications with vitamin deficiences and anemia.    Weight loss expectations were discussed with the patient in detail. The weight loss operations most commonly performed are the sleeve gastrectomy and the Jane-en-Y gastric bypass. These operations result in weight losses up to approximately 25-35% of initial body weight 12 to 24 months after surgery with the gastric bypass usually the higher percent of weight loss but depends on patient using the tool.    For the gastric bypass and loop duodenal switch (SORAIDA-S) the risks include but not limited to the following early complications:  Anastomotic leak/peritonitis, Jane/Alimentary/biliopancreatic limb obstruction, severe & minor wound infection/seroma, and nausea/vomiting.  Late complications  can include but are not limited to malnutrition, vitamin deficiencies, frequent loose stools,  stomal stenosis, marginal ulcer, bowel obstruction, intussusception, internal, and incisional hernia.    Regarding the gastric sleeve, there is less long-term outcome data and higher risk of dysphagia and reflux compared to a gastric bypass, as well as risk of internal visceral/organ injury, splenectomy, bleeding, infection, leak (which could require further intervention possible conversion to Jane-en-Y gastric bypass), stenosis and possibility of regaining weight.    Manish was counseled regarding diagnostic results, instructions for management, risk factor reductions, prognosis, patient and family education, impressions, risks and benefits of treatment options and importance of compliance with treatment. Total face to face time of the encounter was over 45 minutes and over 30 minutes was spent counseling.     Torrie Report   As part of this patient's treatment plan I am prescribing controlled substances. The patient has been made aware of appropriate use of such medications, including potential risk of somnolence, limited ability to drive and /or work safely, and potential for dependence or overdose. It has also been made clear that these medications are for use by this patient only, without concomitant use of alcohol or other substances unless prescribed.    Manish has completed prescribing agreement detailing terms of continued prescribing of controlled substances, including monitoring TORRIE reports, urine drug screening, and pill counts if necessary. Manish is aware that inappropriate use will result in cessation of prescribing such medications.    TORRIE report has been reviewed      History and physical exam exhibit continued safe and appropriate use of controlled substances.      Manish understands the surgical procedures and the different surgical options that are available.  He understands the lifestyle changes  that are required after surgery and has agreed to follow the guidelines outlined in the weight management program.  He also expressed understanding of the risks involved and had all of male questions answered and desires to proceed.      Little Carver MD  5/23/2025

## 2025-05-27 ENCOUNTER — PREP FOR SURGERY (OUTPATIENT)
Dept: OTHER | Facility: HOSPITAL | Age: 45
End: 2025-05-27
Payer: MEDICARE

## 2025-05-27 DIAGNOSIS — E66.01 OBESITY, CLASS III, BMI 40-49.9 (MORBID OBESITY): Primary | ICD-10-CM

## 2025-05-27 DIAGNOSIS — E10.69 TYPE 1 DIABETES MELLITUS WITH MORBID OBESITY: ICD-10-CM

## 2025-05-27 DIAGNOSIS — E66.01 TYPE 1 DIABETES MELLITUS WITH MORBID OBESITY: ICD-10-CM

## 2025-05-27 PROBLEM — E66.813 OBESITY, CLASS III, BMI 40-49.9 (MORBID OBESITY): Status: ACTIVE | Noted: 2025-05-27

## 2025-05-27 RX ORDER — SODIUM CHLORIDE 0.9 % (FLUSH) 0.9 %
3-10 SYRINGE (ML) INJECTION AS NEEDED
OUTPATIENT
Start: 2025-05-27

## 2025-05-27 RX ORDER — SCOPOLAMINE 1 MG/3D
1 PATCH, EXTENDED RELEASE TRANSDERMAL CONTINUOUS
OUTPATIENT
Start: 2025-05-27 | End: 2025-05-30

## 2025-05-27 RX ORDER — PANTOPRAZOLE SODIUM 40 MG/10ML
40 INJECTION, POWDER, LYOPHILIZED, FOR SOLUTION INTRAVENOUS ONCE
OUTPATIENT
Start: 2025-05-27 | End: 2025-05-27

## 2025-05-27 RX ORDER — CHLORHEXIDINE GLUCONATE ORAL RINSE 1.2 MG/ML
15 SOLUTION DENTAL SEE ADMIN INSTRUCTIONS
OUTPATIENT
Start: 2025-05-27

## 2025-05-27 RX ORDER — SODIUM CHLORIDE 0.9 % (FLUSH) 0.9 %
3 SYRINGE (ML) INJECTION EVERY 12 HOURS SCHEDULED
OUTPATIENT
Start: 2025-05-27

## 2025-05-27 RX ORDER — SODIUM CHLORIDE, SODIUM LACTATE, POTASSIUM CHLORIDE, CALCIUM CHLORIDE 600; 310; 30; 20 MG/100ML; MG/100ML; MG/100ML; MG/100ML
100 INJECTION, SOLUTION INTRAVENOUS CONTINUOUS
OUTPATIENT
Start: 2025-05-27 | End: 2025-05-29

## 2025-05-27 RX ORDER — ACETAMINOPHEN 10 MG/ML
1000 INJECTION, SOLUTION INTRAVENOUS ONCE
OUTPATIENT
Start: 2025-05-27 | End: 2025-05-27

## 2025-06-04 ENCOUNTER — TELEPHONE (OUTPATIENT)
Dept: CARDIOLOGY | Facility: CLINIC | Age: 45
End: 2025-06-04
Payer: MEDICARE

## 2025-06-04 ENCOUNTER — OFFICE VISIT (OUTPATIENT)
Dept: CARDIOLOGY | Facility: CLINIC | Age: 45
End: 2025-06-04
Payer: MEDICARE

## 2025-06-04 VITALS
WEIGHT: 315 LBS | HEIGHT: 73 IN | SYSTOLIC BLOOD PRESSURE: 126 MMHG | OXYGEN SATURATION: 98 % | DIASTOLIC BLOOD PRESSURE: 63 MMHG | HEART RATE: 84 BPM | BODY MASS INDEX: 41.75 KG/M2

## 2025-06-04 DIAGNOSIS — Z01.810 ENCOUNTER FOR PRE-OPERATIVE CARDIOVASCULAR CLEARANCE: Primary | ICD-10-CM

## 2025-06-04 DIAGNOSIS — R06.09 DYSPNEA ON EXERTION: ICD-10-CM

## 2025-06-04 DIAGNOSIS — I10 ESSENTIAL HYPERTENSION: ICD-10-CM

## 2025-06-04 DIAGNOSIS — E66.01 MORBID OBESITY: ICD-10-CM

## 2025-06-04 DIAGNOSIS — R73.03 PRE-DIABETES: ICD-10-CM

## 2025-06-04 NOTE — TELEPHONE ENCOUNTER
Appt 6/4  seen today for clearance for bariatric surgery     Printed request in chart from last yr    Signed clearance on my desk  Will fax

## 2025-06-04 NOTE — PROGRESS NOTES
Subjective:     Encounter Date:06/04/2025        Patient ID: Manish Castillo is a 44 y.o. male.    Chief Complaint: pre op clearance for bariatric surgery     History of Present Illness    Mr. Manish Castillo has PMH of     -CAD,  cath 1/27/2021 tortuous RCA with a kink in PLV causing 50% narrowing  -Hypertension  -Morbid obesity  -Palpitations  -On disability due to psych disorder PTSD and panic attacks and THC  -Positive family history of CAD in mother    Here for pre-op evaluation for bariatric surgery.  Patient denies any chest pain.  He does report dyspnea on exertion due to his weight.  He states he has stopped using marijuana and not drinking alcohol completely for upcoming surgery.     Blood pressure today is 126/63 HR 84 oxygen 98% on room air. Weight 349 lbs BMI 46        Patient underwent stress test 1/7/2021 which revealed large inferior ischemia.  The cath 1/27/2021 revealed kinking RCA causing 50% narrowing            Preoperative cardiovascular risk assessment    [place] Perioperative Risk for Myocardial Infarction or Cardiac Arrest (UMER):  0.0% Risk of myocardial infarction or cardiac arrest, intraoperatively or up to 30 days post-op    Revised Cardiac Risk Index for Pre-Operative Risk:  0.0% 30-day risk of death, MI, or cardiac arrest              Lab Review:     Labs from 6/17/2024 BUN 22 creatinine 1.11 glucose 121 AST 49  total cholesterol 230 HDL 50  triglycerides 222        Labs from 5/23/2025 potassium 4.6 BUN 23 creatinine 1.12 glucose 105 AST 71  total cholesterol 236 HDL 50           The following portions of the patient's history were reviewed and updated as appropriate: allergies, current medications, past family history, past medical history, past social history, past surgical history, and problem list.      Review of Systems   Constitutional: Positive for malaise/fatigue.   Cardiovascular:  Positive for dyspnea on exertion. Negative for chest pain, leg  "swelling and palpitations.   Respiratory:  Negative for cough and shortness of breath.    Gastrointestinal:  Negative for abdominal pain, nausea and vomiting.   Neurological:  Negative for dizziness, focal weakness, headaches, light-headedness, numbness and weakness.   All other systems reviewed and are negative.      Past Medical History:   Diagnosis Date    CHF (congestive heart failure)     Constipation     Depression     Episodic mood disorder     Exercise-induced leg cramps     Hair loss     Heartburn     History of psychiatric hospitalization     Hypertension     Panic attacks     Pre-diabetes     PTSD (post-traumatic stress disorder)     Shortness of breath on exertion     Sleep apnea     NO MASK     Substance abuse     past and present per packet    Tired     Weight gain      Past Surgical History:   Procedure Laterality Date    CARDIAC CATHETERIZATION N/A 1/27/2021    Procedure: Left Heart Cath;  Surgeon: Darren Nice MD;  Location: [facility] CATH INVASIVE LOCATION;  Service: Cardiovascular;  Laterality: N/A;    ENDOSCOPY N/A 7/1/2021    Procedure: ESOPHAGOGASTRODUODENOSCOPY with biopsy;  Surgeon: Little Carver MD;  Location: [facility] ENDOSCOPY;  Service: General;  Laterality: N/A;  post : normal anatomy with biopsy    MOUTH SURGERY      TONSILLECTOMY  1993    WRIST SURGERY      lead from pencil removed from wrist     /63 (BP Location: Right arm, Patient Position: Sitting, Cuff Size: Adult)   Pulse 84   Ht 185.4 cm (73\")   Wt (!) 158 kg (349 lb)   SpO2 98%   BMI 46.04 kg/m²   Family History   Problem Relation Age of Onset    Heart disease Mother     Hypertension Mother     Sleep apnea Mother     Diabetes Paternal Grandmother        Current Outpatient Medications:     aspirin (aspirin) 81 MG EC tablet, Take 1 tablet by mouth Daily., Disp: 100 tablet, Rfl: 3    hydroCHLOROthiazide 25 MG tablet, Take 1 tablet by mouth Daily., Disp: 90 tablet, Rfl: 3    losartan (Cozaar) 50 MG tablet, Take 1 " tablet by mouth Daily., Disp: 90 tablet, Rfl: 3    metoprolol succinate XL (TOPROL-XL) 50 MG 24 hr tablet, Take 1 tablet by mouth Daily., Disp: 90 tablet, Rfl: 3    Multiple Vitamins-Minerals (MULTI ADULT GUMMIES PO), Take  by mouth., Disp: , Rfl:     Omega-3 Fatty Acids (Fish Oil) 435 MG capsule, Take  by mouth., Disp: , Rfl:     Plant Sterols and Stanols (CHOLEST OFF PO), Take  by mouth., Disp: , Rfl:     vitamin B-6 (PYRIDOXINE) 50 MG tablet, Take 1 tablet by mouth Daily., Disp: , Rfl:     Allergies   Allergen Reactions    Aspirin Other (See Comments)     Must be coated asprin.      Social History     Socioeconomic History    Marital status: Single   Tobacco Use    Smoking status: Former     Current packs/day: 0.00     Types: Cigarettes     Quit date:      Years since quittin.4     Passive exposure: Past    Smokeless tobacco: Never   Vaping Use    Vaping status: Former    Substances: Nicotine, THC   Substance and Sexual Activity    Alcohol use: Not Currently     Alcohol/week: 12.0 standard drinks of alcohol     Types: 12 Cans of beer per week     Comment: daily    Drug use: Not Currently     Frequency: 7.0 times per week     Types: Marijuana     Comment: stopped for sx 24    Sexual activity: Defer                Objective:     Vitals reviewed.   Constitutional:       Appearance: Not in distress. Morbidly obese.   Neck:      Vascular: No JVR. JVD normal.   Pulmonary:      Effort: Pulmonary effort is normal.      Breath sounds: Normal breath sounds. No wheezing. No rhonchi. No rales.   Chest:      Chest wall: Not tender to palpatation.   Cardiovascular:      PMI at left midclavicular line. Normal rate. Regular rhythm. Normal S1. Normal S2.       Murmurs: There is no murmur.      No gallop.  No click. No rub.   Pulses:     Intact distal pulses.   Edema:     Peripheral edema absent.   Abdominal:      General: Bowel sounds are normal.      Palpations: Abdomen is soft.      Tenderness: There is no  abdominal tenderness.   Musculoskeletal: Normal range of motion.         General: No tenderness. Skin:     General: Skin is warm and dry.   Neurological:      General: No focal deficit present.      Mental Status: Alert and oriented to person, place and time.         ECG 12 Lead    Date/Time: 6/4/2025 3:59 PM  Performed by: Cathryn Palencia APRN    Authorized by: Cathryn Palencia APRN  Comparison: compared with previous ECG from 6/18/2024  Similar to previous ECG  Rhythm: sinus rhythm  Rate: normal  BPM: 75  Other findings: non-specific ST-T wave changes    Clinical impression: non-specific ECG                        Assessment:     MDM       Diagnosis Plan   1. Encounter for pre-operative cardiovascular clearance        2. Essential hypertension        3. Morbid obesity        4. BMI 45.0-49.9, adult        5. Pre-diabetes        6. Dyspnea on exertion                         Plan:   Chart reviewed  Labs reviewed  Medications reviewed  EKG reviewed unremarkable    Continue aspirin until 5 days prior to surgery  Patient is not on statin he takes OTC Cholest off   Advised his liver enzymes are elevated and he should follow up with PCP/his bariatric surgeon regarding this     Continue losartan hydrochlorothiazide metoprolol for hypertension well-controlled today            Patient is still cleared for bariatric surgery from cardiology standpoint  He is low risk for bariatric surgery.     Follow-up with Dr. Nice postsurgery    Electronically signed by JOSUÉ Cruz, 06/06/25, 2:53 PM EDT.                    This document is intended for medical expert use only.  Reading of this document by patients and/or patient's family without participating medical staff guidance may result in misinterpretation and unintended morbidity. Any interpretation of such data is the responsibility of the patient and/or family member responsible for the patient in concert with their primary or specialist providers, not to be left for  sources of online search as such as [employer], [employer] or similar queries.  Relying on these approaches to knowledge may result in misinterpretation, misguided goals of care and even death should patient or family members try recommendations outside of the realm of professional medical care in a supervised inpatient environment.

## 2025-06-04 NOTE — LETTER
June 6, 2025     Little Carver MD  [address] [place] [address]    Patient: Manish Castillo   YOB: 1980   Date of Visit: 6/4/2025     Dear Little Carver MD:       Thank you for referring Manish Castillo to me for evaluation. Below are the relevant portions of my assessment and plan of care.    If you have questions, please do not hesitate to call me. I look forward to following Manish along with you.         Sincerely,        JOSUÉ Cruz        CC: No Recipients    Cathryn Palencia APRN  06/06/25 1501  Signed      Subjective:     Encounter Date:06/04/2025        Patient ID: Manish Castillo is a 44 y.o. male.    Chief Complaint: pre op clearance for bariatric surgery     History of Present Illness    Mr. Manish Castillo has PMH of     -CAD,  cath 1/27/2021 tortuous RCA with a kink in PLV causing 50% narrowing  -Hypertension  -Morbid obesity  -Palpitations  -On disability due to psych disorder PTSD and panic attacks and THC  -Positive family history of CAD in mother    Here for pre-op evaluation for bariatric surgery.  Patient denies any chest pain.  He does report dyspnea on exertion due to his weight.  He states he has stopped using marijuana and not drinking alcohol completely for upcoming surgery.     Blood pressure today is 126/63 HR 84 oxygen 98% on room air. Weight 349 lbs BMI 46        Patient underwent stress test 1/7/2021 which revealed large inferior ischemia.  The cath 1/27/2021 revealed kinking RCA causing 50% narrowing            Preoperative cardiovascular risk assessment    [place] Perioperative Risk for Myocardial Infarction or Cardiac Arrest (UMER):  0.0% Risk of myocardial infarction or cardiac arrest, intraoperatively or up to 30 days post-op    Revised Cardiac Risk Index for Pre-Operative Risk:  0.0% 30-day risk of death, MI, or cardiac arrest              Lab Review:     Labs from 6/17/2024 BUN 22 creatinine 1.11 glucose 121 AST 49  total cholesterol 230 HDL  50  triglycerides 222        Labs from 5/23/2025 potassium 4.6 BUN 23 creatinine 1.12 glucose 105 AST 71  total cholesterol 236 HDL 50           The following portions of the patient's history were reviewed and updated as appropriate: allergies, current medications, past family history, past medical history, past social history, past surgical history, and problem list.      Review of Systems   Constitutional: Positive for malaise/fatigue.   Cardiovascular:  Positive for dyspnea on exertion. Negative for chest pain, leg swelling and palpitations.   Respiratory:  Negative for cough and shortness of breath.    Gastrointestinal:  Negative for abdominal pain, nausea and vomiting.   Neurological:  Negative for dizziness, focal weakness, headaches, light-headedness, numbness and weakness.   All other systems reviewed and are negative.      Past Medical History:   Diagnosis Date   • CHF (congestive heart failure)    • Constipation    • Depression    • Episodic mood disorder    • Exercise-induced leg cramps    • Hair loss    • Heartburn    • History of psychiatric hospitalization    • Hypertension    • Panic attacks    • Pre-diabetes    • PTSD (post-traumatic stress disorder)    • Shortness of breath on exertion    • Sleep apnea     NO MASK    • Substance abuse     past and present per packet   • Tired    • Weight gain      Past Surgical History:   Procedure Laterality Date   • CARDIAC CATHETERIZATION N/A 1/27/2021    Procedure: Left Heart Cath;  Surgeon: Darren Nice MD;  Location: [facility] CATH INVASIVE LOCATION;  Service: Cardiovascular;  Laterality: N/A;   • ENDOSCOPY N/A 7/1/2021    Procedure: ESOPHAGOGASTRODUODENOSCOPY with biopsy;  Surgeon: Little Carver MD;  Location: [facility] ENDOSCOPY;  Service: General;  Laterality: N/A;  post : normal anatomy with biopsy   • MOUTH SURGERY     • TONSILLECTOMY  1993   • WRIST SURGERY      lead from pencil removed from wrist     /63 (BP Location:  "Right arm, Patient Position: Sitting, Cuff Size: Adult)   Pulse 84   Ht 185.4 cm (73\")   Wt (!) 158 kg (349 lb)   SpO2 98%   BMI 46.04 kg/m²   Family History   Problem Relation Age of Onset   • Heart disease Mother    • Hypertension Mother    • Sleep apnea Mother    • Diabetes Paternal Grandmother        Current Outpatient Medications:   •  aspirin (aspirin) 81 MG EC tablet, Take 1 tablet by mouth Daily., Disp: 100 tablet, Rfl: 3  •  hydroCHLOROthiazide 25 MG tablet, Take 1 tablet by mouth Daily., Disp: 90 tablet, Rfl: 3  •  losartan (Cozaar) 50 MG tablet, Take 1 tablet by mouth Daily., Disp: 90 tablet, Rfl: 3  •  metoprolol succinate XL (TOPROL-XL) 50 MG 24 hr tablet, Take 1 tablet by mouth Daily., Disp: 90 tablet, Rfl: 3  •  Multiple Vitamins-Minerals (MULTI ADULT GUMMIES PO), Take  by mouth., Disp: , Rfl:   •  Omega-3 Fatty Acids (Fish Oil) 435 MG capsule, Take  by mouth., Disp: , Rfl:   •  Plant Sterols and Stanols (CHOLEST OFF PO), Take  by mouth., Disp: , Rfl:   •  vitamin B-6 (PYRIDOXINE) 50 MG tablet, Take 1 tablet by mouth Daily., Disp: , Rfl:     Allergies   Allergen Reactions   • Aspirin Other (See Comments)     Must be coated asprin.      Social History     Socioeconomic History   • Marital status: Single   Tobacco Use   • Smoking status: Former     Current packs/day: 0.00     Types: Cigarettes     Quit date:      Years since quittin.4     Passive exposure: Past   • Smokeless tobacco: Never   Vaping Use   • Vaping status: Former   • Substances: Nicotine, THC   Substance and Sexual Activity   • Alcohol use: Not Currently     Alcohol/week: 12.0 standard drinks of alcohol     Types: 12 Cans of beer per week     Comment: daily   • Drug use: Not Currently     Frequency: 7.0 times per week     Types: Marijuana     Comment: stopped for sx 24   • Sexual activity: Defer                Objective:     Vitals reviewed.   Constitutional:       Appearance: Not in distress. Morbidly obese.   Neck:    "   Vascular: No JVR. JVD normal.   Pulmonary:      Effort: Pulmonary effort is normal.      Breath sounds: Normal breath sounds. No wheezing. No rhonchi. No rales.   Chest:      Chest wall: Not tender to palpatation.   Cardiovascular:      PMI at left midclavicular line. Normal rate. Regular rhythm. Normal S1. Normal S2.       Murmurs: There is no murmur.      No gallop.  No click. No rub.   Pulses:     Intact distal pulses.   Edema:     Peripheral edema absent.   Abdominal:      General: Bowel sounds are normal.      Palpations: Abdomen is soft.      Tenderness: There is no abdominal tenderness.   Musculoskeletal: Normal range of motion.         General: No tenderness. Skin:     General: Skin is warm and dry.   Neurological:      General: No focal deficit present.      Mental Status: Alert and oriented to person, place and time.         ECG 12 Lead    Date/Time: 6/4/2025 3:59 PM  Performed by: Cathryn Palencia APRN    Authorized by: Cathryn Palencia APRN  Comparison: compared with previous ECG from 6/18/2024  Similar to previous ECG  Rhythm: sinus rhythm  Rate: normal  BPM: 75  Other findings: non-specific ST-T wave changes    Clinical impression: non-specific ECG                        Assessment:     MDM       Diagnosis Plan   1. Encounter for pre-operative cardiovascular clearance        2. Essential hypertension        3. Morbid obesity        4. BMI 45.0-49.9, adult        5. Pre-diabetes        6. Dyspnea on exertion                         Plan:   Chart reviewed  Labs reviewed  Medications reviewed  EKG reviewed unremarkable    Continue aspirin until 5 days prior to surgery  Patient is not on statin he takes OTC Cholest off   Advised his liver enzymes are elevated and he should follow up with PCP/his bariatric surgeon regarding this     Continue losartan hydrochlorothiazide metoprolol for hypertension well-controlled today            Patient is still cleared for bariatric surgery from cardiology standpoint  He is  low risk for bariatric surgery.     Follow-up with Dr. Nice postsurgery    Electronically signed by JOSUÉ Cruz, 06/06/25, 2:53 PM EDT.                    This document is intended for medical expert use only.  Reading of this document by patients and/or patient's family without participating medical staff guidance may result in misinterpretation and unintended morbidity. Any interpretation of such data is the responsibility of the patient and/or family member responsible for the patient in concert with their primary or specialist providers, not to be left for sources of online search as such as [employer], [employer] or similar queries.  Relying on these approaches to knowledge may result in misinterpretation, misguided goals of care and even death should patient or family members try recommendations outside of the realm of professional medical care in a supervised inpatient environment.

## 2025-06-06 PROBLEM — R73.03 PRE-DIABETES: Status: ACTIVE | Noted: 2025-06-06

## 2025-06-11 ENCOUNTER — TELEPHONE (OUTPATIENT)
Dept: CARDIOLOGY | Facility: CLINIC | Age: 45
End: 2025-06-11
Payer: MEDICARE

## 2025-06-11 RX ORDER — METOPROLOL SUCCINATE 50 MG/1
50 TABLET, EXTENDED RELEASE ORAL DAILY
Qty: 90 TABLET | Refills: 3 | Status: SHIPPED | OUTPATIENT
Start: 2025-06-11

## 2025-06-11 RX ORDER — LOSARTAN POTASSIUM 50 MG/1
50 TABLET ORAL DAILY
Qty: 90 TABLET | Refills: 3 | Status: SHIPPED | OUTPATIENT
Start: 2025-06-11

## 2025-06-11 RX ORDER — HYDROCHLOROTHIAZIDE 25 MG/1
25 TABLET ORAL DAILY
Qty: 90 TABLET | Refills: 3 | Status: SHIPPED | OUTPATIENT
Start: 2025-06-11

## 2025-06-11 NOTE — TELEPHONE ENCOUNTER
Caller: Noemí Castillodereck NAGEL    Relationship: Self    Best call back number: [phone]    Requested Prescriptions:   Requested Prescriptions     Pending Prescriptions Disp Refills    metoprolol succinate XL (TOPROL-XL) 50 MG 24 hr tablet 90 tablet 3     Sig: Take 1 tablet by mouth Daily.        Pharmacy where request should be sent: [id number] [address] [facility] [phone] [facility] [phone]      Last office visit with prescribing clinician: 6/18/2024   Last telemedicine visit with prescribing clinician: Visit date not found   Next office visit with prescribing clinician: 12/11/2025     Additional details provided by patient: PT STATES THEY HAVE 4 TABLETS LEFT. UNSURE IF DR ALLEN WANTS TO REFILL THIS ONE SINCE PT IS HAVING GASTRIC SLEEVE SURGERY ON 7/9. IF DR ALLEN STILL WISHES TO FILL THIS, WOULD LIKE RX SENT IN FOR 90 DAYS SUPPLY.    Does the patient have less than a 3 day supply:  [] Yes  [x] No    Would you like a call back once the refill request has been completed: [x] Yes [] No    If the office needs to give you a call back, can they leave a voicemail: [] Yes [x] No    Surekha Dia Rep   06/11/25 09:55 EDT

## 2025-06-11 NOTE — TELEPHONE ENCOUNTER
Rx Refill Note  Requested Prescriptions     Pending Prescriptions Disp Refills    hydroCHLOROthiazide 25 MG tablet [Pharmacy Med Name: hydroCHLOROthiazide Oral Tablet 25 MG] 90 tablet 0     Sig: TAKE 1 TABLET BY MOUTH EVERY DAY    losartan (COZAAR) 50 MG tablet [Pharmacy Med Name: Losartan Potassium Oral Tablet 50 MG] 90 tablet 0     Sig: TAKE 1 TABLET BY MOUTH EVERY DAY      Last office visit with prescribing clinician: 6/18/2024   Last telemedicine visit with prescribing clinician: Visit date not found   Next office visit with prescribing clinician: 12/11/2025                         Would you like a call back once the refill request has been completed: [] Yes [] No    If the office needs to give you a call back, can they leave a voicemail: [] Yes [] No    Mckenna Castellon MA  06/11/25, 08:30 EDT   none

## 2025-06-18 ENCOUNTER — OFFICE VISIT (OUTPATIENT)
Dept: BARIATRICS/WEIGHT MGMT | Facility: CLINIC | Age: 45
End: 2025-06-18
Payer: MEDICARE

## 2025-06-18 VITALS — HEIGHT: 73 IN | WEIGHT: 315 LBS | BODY MASS INDEX: 41.75 KG/M2

## 2025-06-18 DIAGNOSIS — Z71.89 ENCOUNTER FOR PRE-BARIATRIC SURGERY COUNSELING AND EDUCATION: Primary | ICD-10-CM

## 2025-06-27 ENCOUNTER — TELEPHONE (OUTPATIENT)
Dept: BARIATRICS/WEIGHT MGMT | Facility: CLINIC | Age: 45
End: 2025-06-27
Payer: MEDICARE

## 2025-06-30 ENCOUNTER — LAB (OUTPATIENT)
Dept: LAB | Facility: HOSPITAL | Age: 45
End: 2025-06-30
Payer: MEDICARE

## 2025-06-30 DIAGNOSIS — E66.813 OBESITY, CLASS III, BMI 40-49.9 (MORBID OBESITY): ICD-10-CM

## 2025-06-30 LAB
ABO GROUP BLD: NORMAL
ANION GAP SERPL CALCULATED.3IONS-SCNC: 14.8 MMOL/L (ref 5–15)
BASOPHILS # BLD AUTO: 0.04 10*3/MM3 (ref 0–0.2)
BASOPHILS NFR BLD AUTO: 0.5 % (ref 0–1.5)
BLD GP AB SCN SERPL QL: NEGATIVE
BUN SERPL-MCNC: 16 MG/DL (ref 6–20)
BUN/CREAT SERPL: 13.6 (ref 7–25)
CALCIUM SPEC-SCNC: 10 MG/DL (ref 8.6–10.5)
CHLORIDE SERPL-SCNC: 105 MMOL/L (ref 98–107)
CO2 SERPL-SCNC: 23.2 MMOL/L (ref 22–29)
CREAT SERPL-MCNC: 1.18 MG/DL (ref 0.76–1.27)
DEPRECATED RDW RBC AUTO: 41.1 FL (ref 37–54)
EGFRCR SERPLBLD CKD-EPI 2021: 79 ML/MIN/1.73
EOSINOPHIL # BLD AUTO: 0.21 10*3/MM3 (ref 0–0.4)
EOSINOPHIL NFR BLD AUTO: 2.6 % (ref 0.3–6.2)
ERYTHROCYTE [DISTWIDTH] IN BLOOD BY AUTOMATED COUNT: 12.5 % (ref 12.3–15.4)
GLUCOSE SERPL-MCNC: 110 MG/DL (ref 65–99)
HBA1C MFR BLD: 5.8 % (ref 4.8–5.6)
HCT VFR BLD AUTO: 46.7 % (ref 37.5–51)
HGB BLD-MCNC: 15.5 G/DL (ref 13–17.7)
IMM GRANULOCYTES # BLD AUTO: 0.03 10*3/MM3 (ref 0–0.05)
IMM GRANULOCYTES NFR BLD AUTO: 0.4 % (ref 0–0.5)
LYMPHOCYTES # BLD AUTO: 2.54 10*3/MM3 (ref 0.7–3.1)
LYMPHOCYTES NFR BLD AUTO: 31.8 % (ref 19.6–45.3)
MCH RBC QN AUTO: 29.8 PG (ref 26.6–33)
MCHC RBC AUTO-ENTMCNC: 33.2 G/DL (ref 31.5–35.7)
MCV RBC AUTO: 89.6 FL (ref 79–97)
MONOCYTES # BLD AUTO: 0.54 10*3/MM3 (ref 0.1–0.9)
MONOCYTES NFR BLD AUTO: 6.8 % (ref 5–12)
NEUTROPHILS NFR BLD AUTO: 4.64 10*3/MM3 (ref 1.7–7)
NEUTROPHILS NFR BLD AUTO: 57.9 % (ref 42.7–76)
NRBC BLD AUTO-RTO: 0 /100 WBC (ref 0–0.2)
PLATELET # BLD AUTO: 294 10*3/MM3 (ref 140–450)
PMV BLD AUTO: 10.9 FL (ref 6–12)
POTASSIUM SERPL-SCNC: 4.2 MMOL/L (ref 3.5–5.2)
RBC # BLD AUTO: 5.21 10*6/MM3 (ref 4.14–5.8)
RH BLD: POSITIVE
SODIUM SERPL-SCNC: 143 MMOL/L (ref 136–145)
T&S EXPIRATION DATE: NORMAL
WBC NRBC COR # BLD AUTO: 8 10*3/MM3 (ref 3.4–10.8)

## 2025-06-30 PROCEDURE — 36415 COLL VENOUS BLD VENIPUNCTURE: CPT

## 2025-06-30 PROCEDURE — 86901 BLOOD TYPING SEROLOGIC RH(D): CPT

## 2025-06-30 PROCEDURE — 86850 RBC ANTIBODY SCREEN: CPT

## 2025-06-30 PROCEDURE — 83036 HEMOGLOBIN GLYCOSYLATED A1C: CPT

## 2025-06-30 PROCEDURE — 86900 BLOOD TYPING SEROLOGIC ABO: CPT

## 2025-06-30 PROCEDURE — 85025 COMPLETE CBC W/AUTO DIFF WBC: CPT

## 2025-06-30 PROCEDURE — 80048 BASIC METABOLIC PNL TOTAL CA: CPT

## 2025-07-08 ENCOUNTER — ANESTHESIA EVENT (OUTPATIENT)
Dept: PERIOP | Facility: HOSPITAL | Age: 45
End: 2025-07-08
Payer: MEDICARE

## 2025-07-08 NOTE — ANESTHESIA PREPROCEDURE EVALUATION
Anesthesia Evaluation     Patient summary reviewed and Nursing notes reviewed   no history of anesthetic complications:   NPO Solid Status: > 8 hours  NPO Liquid Status: > 2 hours           Airway   Dental      Pulmonary    (+) a smoker Former,sleep apnea on CPAP  Cardiovascular     ECG reviewed  Patient on routine beta blocker    (+) hypertension, CARTER      Neuro/Psych  (+) psychiatric history Anxiety, Depression and PTSD  GI/Hepatic/Renal/Endo    (+) morbid obesity, GERD    Musculoskeletal     Abdominal    Substance History   (+) drug use     OB/GYN          Other        ROS/Med Hx Other: Additional History:  Pre-DM, panic d/o, h/o substance abuse, MJ abuse      Stress Test:  NUCLEAR IMAGIN.  There was large severe defect involving all of inferior wall which is partially reversible consistent with ischemia seen.  2.  Gated images reveal normal LV size and contractility, LVEF of 53%.     CONCLUSION:  1.  Stress Cardiolite with abnormal perfusion, large inferior ischemia.  2.  Normal wall motion LVEF of 53%.     RECOMMENDATIONS:     Cardiac cath recommended.      Cath:  Hemodynamics     Pressures     Ao:                                                  126/91  mmHg     LV:                                                   106/8    mmHg  End-diastolic pressure:    8            mmHg  No significant aortic valvular gradient on pullback              Coronary Angiography     Left Main :  The left main   is without disease     Left Anterior Descending : LAD starts of is a large vessel gives rise to a large proximal diagonal in midportion is tortuous with bridging and kinking distal portion has diffuse tapering.     Ramus intermedius : A small caliber vessel without disease.     Left Circumflex : Gives rise to a proximal marginal which is moderate caliber than distal circumflex divides into 2 with diffuse tapering     Right Coronary Artery :  The right coronary artery   is dominant vessel starts of is a large  Message to Dr. Padgett to ask if patient can be added for 41 week appointment on Saturday 12/28?   caliber vessel is tortuous.  Gives rise to early PDA without disease.  The PLV continuation branch which is large is tortuous has haziness in [place] cranial views which appears to be less than 50% stenosis and more due to a kink.                   Dominance:    Left    Right    Co-Dominant                       Left Ventriculography:      Only LV pressures were measured LV gram was not done     Impression:     1. Tortuous RCA causing a kink in PLV branch which is causing up to 50% narrowing.     Recommendations:     1. We will put him on aspirin and statins as tolerated.  2. Okay for bariatric surgery from cardiovascular standpoint.      PSH:  TONSILLECTOMY WRIST SURGERY  CARDIAC CATHETERIZATION MOUTH SURGERY  ENDOSCOPY                 Anesthesia Plan    ASA 3     general and ERAS Protocol   total IV anesthesia  (Patient identified; pre-operative vital signs, all relevant labs/studies, complete medical/surgical/anesthetic history, full medication list, full allergy list, and NPO status obtained/reviewed; physical assessment performed; anesthetic options, side effects, potential complications, risks, and benefits discussed; questions answered; verbal and/or written anesthesia consent obtained; patient cleared for procedure; anesthesia machine and equipment checked and functioning)  intravenous induction     Anesthetic plan, risks, benefits, and alternatives have been provided, discussed and informed consent has been obtained with: patient.    Use of blood products discussed with  Consented to blood products.    Plan discussed with CRNA and CAA.    CODE STATUS:

## 2025-07-09 ENCOUNTER — ANESTHESIA (OUTPATIENT)
Dept: PERIOP | Facility: HOSPITAL | Age: 45
End: 2025-07-09
Payer: MEDICARE

## 2025-07-09 ENCOUNTER — HOSPITAL ENCOUNTER (INPATIENT)
Facility: HOSPITAL | Age: 45
LOS: 1 days | Discharge: HOME | End: 2025-07-10
Attending: SURGERY | Admitting: SURGERY
Payer: MEDICARE

## 2025-07-09 DIAGNOSIS — E66.813 OBESITY, CLASS III, BMI 40-49.9 (MORBID OBESITY): ICD-10-CM

## 2025-07-09 DIAGNOSIS — G89.18 POST-OP PAIN: Primary | ICD-10-CM

## 2025-07-09 LAB
ALBUMIN SERPL-MCNC: 4.2 G/DL (ref 3.5–5.2)
ALBUMIN/GLOB SERPL: 1.4 G/DL
ALP SERPL-CCNC: 52 U/L (ref 39–117)
ALT SERPL W P-5'-P-CCNC: 133 U/L (ref 1–41)
ANION GAP SERPL CALCULATED.3IONS-SCNC: 17.4 MMOL/L (ref 5–15)
AST SERPL-CCNC: 65 U/L (ref 1–40)
BASOPHILS # BLD AUTO: 0.02 10*3/MM3 (ref 0–0.2)
BASOPHILS NFR BLD AUTO: 0.2 % (ref 0–1.5)
BILIRUB SERPL-MCNC: 0.4 MG/DL (ref 0–1.2)
BUN SERPL-MCNC: 25.9 MG/DL (ref 6–20)
BUN/CREAT SERPL: 23.3 (ref 7–25)
CALCIUM SPEC-SCNC: 9.5 MG/DL (ref 8.6–10.5)
CHLORIDE SERPL-SCNC: 100 MMOL/L (ref 98–107)
CO2 SERPL-SCNC: 17.6 MMOL/L (ref 22–29)
CREAT SERPL-MCNC: 1.11 MG/DL (ref 0.76–1.27)
DEPRECATED RDW RBC AUTO: 38.9 FL (ref 37–54)
EGFRCR SERPLBLD CKD-EPI 2021: 85 ML/MIN/1.73
EOSINOPHIL # BLD AUTO: 0 10*3/MM3 (ref 0–0.4)
EOSINOPHIL NFR BLD AUTO: 0 % (ref 0.3–6.2)
ERYTHROCYTE [DISTWIDTH] IN BLOOD BY AUTOMATED COUNT: 12.1 % (ref 12.3–15.4)
GLOBULIN UR ELPH-MCNC: 3 GM/DL
GLUCOSE SERPL-MCNC: 197 MG/DL (ref 65–99)
HCT VFR BLD AUTO: 43.7 % (ref 37.5–51)
HGB BLD-MCNC: 14.9 G/DL (ref 13–17.7)
IMM GRANULOCYTES # BLD AUTO: 0.04 10*3/MM3 (ref 0–0.05)
IMM GRANULOCYTES NFR BLD AUTO: 0.4 % (ref 0–0.5)
LYMPHOCYTES # BLD AUTO: 0.68 10*3/MM3 (ref 0.7–3.1)
LYMPHOCYTES NFR BLD AUTO: 6.3 % (ref 19.6–45.3)
MAGNESIUM SERPL-MCNC: 1.9 MG/DL (ref 1.6–2.6)
MCH RBC QN AUTO: 29.8 PG (ref 26.6–33)
MCHC RBC AUTO-ENTMCNC: 34.1 G/DL (ref 31.5–35.7)
MCV RBC AUTO: 87.4 FL (ref 79–97)
MONOCYTES # BLD AUTO: 0.15 10*3/MM3 (ref 0.1–0.9)
MONOCYTES NFR BLD AUTO: 1.4 % (ref 5–12)
NEUTROPHILS NFR BLD AUTO: 9.86 10*3/MM3 (ref 1.7–7)
NEUTROPHILS NFR BLD AUTO: 91.7 % (ref 42.7–76)
NRBC BLD AUTO-RTO: 0 /100 WBC (ref 0–0.2)
PHOSPHATE SERPL-MCNC: 4 MG/DL (ref 2.5–4.5)
PLATELET # BLD AUTO: 241 10*3/MM3 (ref 140–450)
PMV BLD AUTO: 10.9 FL (ref 6–12)
POTASSIUM SERPL-SCNC: 3.5 MMOL/L (ref 3.5–5.2)
PROT SERPL-MCNC: 7.2 G/DL (ref 6–8.5)
RBC # BLD AUTO: 5 10*6/MM3 (ref 4.14–5.8)
SODIUM SERPL-SCNC: 135 MMOL/L (ref 136–145)
WBC NRBC COR # BLD AUTO: 10.75 10*3/MM3 (ref 3.4–10.8)

## 2025-07-09 PROCEDURE — 25010000002 CEFAZOLIN 3 G RECONSTITUTED SOLUTION 1 EACH VIAL: Performed by: SURGERY

## 2025-07-09 PROCEDURE — 25010000002 THIAMINE PER 100 MG: Performed by: SURGERY

## 2025-07-09 PROCEDURE — 25010000002 ACETAMINOPHEN 10 MG/ML SOLUTION: Performed by: SURGERY

## 2025-07-09 PROCEDURE — 25010000002 DEXAMETHASONE PER 1 MG: Performed by: NURSE ANESTHETIST, CERTIFIED REGISTERED

## 2025-07-09 PROCEDURE — 25810000003 LACTATED RINGERS PER 1000 ML: Performed by: SURGERY

## 2025-07-09 PROCEDURE — 25010000002 HYDROMORPHONE PER 4 MG: Performed by: NURSE ANESTHETIST, CERTIFIED REGISTERED

## 2025-07-09 PROCEDURE — 25010000002 ONDANSETRON PER 1 MG: Performed by: NURSE ANESTHETIST, CERTIFIED REGISTERED

## 2025-07-09 PROCEDURE — 84100 ASSAY OF PHOSPHORUS: CPT | Performed by: SURGERY

## 2025-07-09 PROCEDURE — 25010000002 MIDAZOLAM PER 1 MG: Performed by: ANESTHESIOLOGY

## 2025-07-09 PROCEDURE — 80053 COMPREHEN METABOLIC PANEL: CPT | Performed by: SURGERY

## 2025-07-09 PROCEDURE — 25010000002 HYDROMORPHONE 1 MG/ML SOLUTION: Performed by: NURSE ANESTHETIST, CERTIFIED REGISTERED

## 2025-07-09 PROCEDURE — 25010000002 SUGAMMADEX 200 MG/2ML SOLUTION: Performed by: NURSE ANESTHETIST, CERTIFIED REGISTERED

## 2025-07-09 PROCEDURE — 25010000002 MIDAZOLAM PER 1 MG: Performed by: NURSE ANESTHETIST, CERTIFIED REGISTERED

## 2025-07-09 PROCEDURE — 25010000002 PROPOFOL 10 MG/ML EMULSION: Performed by: NURSE ANESTHETIST, CERTIFIED REGISTERED

## 2025-07-09 PROCEDURE — 25010000002 FAMOTIDINE 10 MG/ML SOLUTION: Performed by: SURGERY

## 2025-07-09 PROCEDURE — 88307 TISSUE EXAM BY PATHOLOGIST: CPT | Performed by: SURGERY

## 2025-07-09 PROCEDURE — 25010000002 LIDOCAINE PF 2% 2 % SOLUTION: Performed by: NURSE ANESTHETIST, CERTIFIED REGISTERED

## 2025-07-09 PROCEDURE — 83735 ASSAY OF MAGNESIUM: CPT | Performed by: SURGERY

## 2025-07-09 PROCEDURE — 25010000002 ROPIVACAINE PER 1 MG: Performed by: SURGERY

## 2025-07-09 PROCEDURE — 25010000002 KETOROLAC TROMETHAMINE PER 15 MG: Performed by: SURGERY

## 2025-07-09 PROCEDURE — 25010000002 FENTANYL CITRATE (PF) 50 MCG/ML SOLUTION: Performed by: NURSE ANESTHETIST, CERTIFIED REGISTERED

## 2025-07-09 PROCEDURE — 85025 COMPLETE CBC W/AUTO DIFF WBC: CPT | Performed by: SURGERY

## 2025-07-09 PROCEDURE — 0DB64Z3 EXCISION OF STOMACH, PERCUTANEOUS ENDOSCOPIC APPROACH, VERTICAL: ICD-10-PCS | Performed by: SURGERY

## 2025-07-09 PROCEDURE — 43775 LAP SLEEVE GASTRECTOMY: CPT | Performed by: SURGERY

## 2025-07-09 PROCEDURE — 25010000002 EPINEPHRINE 1 MG/ML SOLUTION: Performed by: SURGERY

## 2025-07-09 PROCEDURE — 8E0W4CZ ROBOTIC ASSISTED PROCEDURE OF TRUNK REGION, PERCUTANEOUS ENDOSCOPIC APPROACH: ICD-10-PCS | Performed by: SURGERY

## 2025-07-09 PROCEDURE — 25010000002 FENTANYL CITRATE (PF) 100 MCG/2ML SOLUTION: Performed by: NURSE ANESTHETIST, CERTIFIED REGISTERED

## 2025-07-09 PROCEDURE — 25010000002 CLONIDINE PER 1 MG: Performed by: SURGERY

## 2025-07-09 PROCEDURE — S0260 H&P FOR SURGERY: HCPCS | Performed by: SURGERY

## 2025-07-09 DEVICE — ABSORBABLE WOUND CLOSURE DEVICE
Type: IMPLANTABLE DEVICE | Site: ABDOMEN | Status: FUNCTIONAL
Brand: V-LOC 180

## 2025-07-09 DEVICE — STAPLER 60 RELOAD BLUE
Type: IMPLANTABLE DEVICE | Site: ABDOMEN | Status: FUNCTIONAL
Brand: SUREFORM

## 2025-07-09 DEVICE — STAPLER 60 RELOAD WHITE
Type: IMPLANTABLE DEVICE | Site: ABDOMEN | Status: FUNCTIONAL
Brand: SUREFORM

## 2025-07-09 RX ORDER — SODIUM CHLORIDE 0.9 % (FLUSH) 0.9 %
10 SYRINGE (ML) INJECTION EVERY 12 HOURS SCHEDULED
Status: DISCONTINUED | OUTPATIENT
Start: 2025-07-09 | End: 2025-07-09 | Stop reason: HOSPADM

## 2025-07-09 RX ORDER — IPRATROPIUM BROMIDE AND ALBUTEROL SULFATE 2.5; .5 MG/3ML; MG/3ML
3 SOLUTION RESPIRATORY (INHALATION) ONCE AS NEEDED
Status: DISCONTINUED | OUTPATIENT
Start: 2025-07-09 | End: 2025-07-09 | Stop reason: HOSPADM

## 2025-07-09 RX ORDER — DEXAMETHASONE SODIUM PHOSPHATE 4 MG/ML
INJECTION, SOLUTION INTRA-ARTICULAR; INTRALESIONAL; INTRAMUSCULAR; INTRAVENOUS; SOFT TISSUE AS NEEDED
Status: DISCONTINUED | OUTPATIENT
Start: 2025-07-09 | End: 2025-07-09 | Stop reason: SURG

## 2025-07-09 RX ORDER — SODIUM CHLORIDE, SODIUM LACTATE, POTASSIUM CHLORIDE, CALCIUM CHLORIDE 600; 310; 30; 20 MG/100ML; MG/100ML; MG/100ML; MG/100ML
100 INJECTION, SOLUTION INTRAVENOUS CONTINUOUS
Status: DISCONTINUED | OUTPATIENT
Start: 2025-07-09 | End: 2025-07-10 | Stop reason: HOSPADM

## 2025-07-09 RX ORDER — HYDROMORPHONE HYDROCHLORIDE 1 MG/ML
0.5 INJECTION, SOLUTION INTRAMUSCULAR; INTRAVENOUS; SUBCUTANEOUS
Status: DISCONTINUED | OUTPATIENT
Start: 2025-07-09 | End: 2025-07-10 | Stop reason: HOSPADM

## 2025-07-09 RX ORDER — METOCLOPRAMIDE HYDROCHLORIDE 5 MG/ML
10 INJECTION INTRAMUSCULAR; INTRAVENOUS EVERY 6 HOURS PRN
Status: DISCONTINUED | OUTPATIENT
Start: 2025-07-09 | End: 2025-07-10 | Stop reason: HOSPADM

## 2025-07-09 RX ORDER — LIDOCAINE HYDROCHLORIDE 10 MG/ML
0.5 INJECTION, SOLUTION EPIDURAL; INFILTRATION; INTRACAUDAL; PERINEURAL ONCE AS NEEDED
Status: DISCONTINUED | OUTPATIENT
Start: 2025-07-09 | End: 2025-07-09 | Stop reason: HOSPADM

## 2025-07-09 RX ORDER — LIDOCAINE HYDROCHLORIDE 20 MG/ML
INJECTION, SOLUTION EPIDURAL; INFILTRATION; INTRACAUDAL; PERINEURAL AS NEEDED
Status: DISCONTINUED | OUTPATIENT
Start: 2025-07-09 | End: 2025-07-09 | Stop reason: SURG

## 2025-07-09 RX ORDER — PROMETHAZINE HYDROCHLORIDE 25 MG/1
12.5 TABLET ORAL EVERY 6 HOURS PRN
Status: DISCONTINUED | OUTPATIENT
Start: 2025-07-09 | End: 2025-07-10 | Stop reason: HOSPADM

## 2025-07-09 RX ORDER — DIPHENHYDRAMINE HYDROCHLORIDE 50 MG/ML
12.5 INJECTION, SOLUTION INTRAMUSCULAR; INTRAVENOUS
Status: DISCONTINUED | OUTPATIENT
Start: 2025-07-09 | End: 2025-07-09 | Stop reason: HOSPADM

## 2025-07-09 RX ORDER — TRANEXAMIC ACID 10 MG/ML
INJECTION, SOLUTION INTRAVENOUS
Status: COMPLETED
Start: 2025-07-09 | End: 2025-07-09

## 2025-07-09 RX ORDER — FLUMAZENIL 0.1 MG/ML
0.2 INJECTION INTRAVENOUS AS NEEDED
Status: DISCONTINUED | OUTPATIENT
Start: 2025-07-09 | End: 2025-07-09 | Stop reason: HOSPADM

## 2025-07-09 RX ORDER — ACETAMINOPHEN 160 MG/5ML
1000 SOLUTION ORAL EVERY 6 HOURS
Status: DISCONTINUED | OUTPATIENT
Start: 2025-07-09 | End: 2025-07-10 | Stop reason: HOSPADM

## 2025-07-09 RX ORDER — SODIUM CHLORIDE 0.9 % (FLUSH) 0.9 %
3 SYRINGE (ML) INJECTION EVERY 12 HOURS SCHEDULED
Status: DISCONTINUED | OUTPATIENT
Start: 2025-07-09 | End: 2025-07-10 | Stop reason: HOSPADM

## 2025-07-09 RX ORDER — DROPERIDOL 2.5 MG/ML
0.62 INJECTION, SOLUTION INTRAMUSCULAR; INTRAVENOUS
Status: DISCONTINUED | OUTPATIENT
Start: 2025-07-09 | End: 2025-07-09 | Stop reason: HOSPADM

## 2025-07-09 RX ORDER — SCOPOLAMINE 1 MG/3D
1 PATCH, EXTENDED RELEASE TRANSDERMAL CONTINUOUS
Status: DISCONTINUED | OUTPATIENT
Start: 2025-07-09 | End: 2025-07-10 | Stop reason: HOSPADM

## 2025-07-09 RX ORDER — ONDANSETRON 2 MG/ML
8 INJECTION INTRAMUSCULAR; INTRAVENOUS EVERY 6 HOURS PRN
Status: DISCONTINUED | OUTPATIENT
Start: 2025-07-09 | End: 2025-07-10 | Stop reason: HOSPADM

## 2025-07-09 RX ORDER — ATROPINE SULFATE 0.4 MG/ML
0.4 INJECTION, SOLUTION INTRAMUSCULAR; INTRAVENOUS; SUBCUTANEOUS ONCE AS NEEDED
Status: DISCONTINUED | OUTPATIENT
Start: 2025-07-09 | End: 2025-07-09 | Stop reason: HOSPADM

## 2025-07-09 RX ORDER — PROPOFOL 10 MG/ML
VIAL (ML) INTRAVENOUS AS NEEDED
Status: DISCONTINUED | OUTPATIENT
Start: 2025-07-09 | End: 2025-07-09 | Stop reason: SURG

## 2025-07-09 RX ORDER — PANTOPRAZOLE SODIUM 40 MG/10ML
40 INJECTION, POWDER, LYOPHILIZED, FOR SOLUTION INTRAVENOUS ONCE
Status: COMPLETED | OUTPATIENT
Start: 2025-07-09 | End: 2025-07-09

## 2025-07-09 RX ORDER — NALOXONE HCL 0.4 MG/ML
0.2 VIAL (ML) INJECTION AS NEEDED
Status: DISCONTINUED | OUTPATIENT
Start: 2025-07-09 | End: 2025-07-09 | Stop reason: HOSPADM

## 2025-07-09 RX ORDER — FAMOTIDINE 10 MG/ML
20 INJECTION, SOLUTION INTRAVENOUS EVERY 12 HOURS SCHEDULED
Status: DISCONTINUED | OUTPATIENT
Start: 2025-07-09 | End: 2025-07-10 | Stop reason: HOSPADM

## 2025-07-09 RX ORDER — HYDROMORPHONE HYDROCHLORIDE 1 MG/ML
0.5 INJECTION, SOLUTION INTRAMUSCULAR; INTRAVENOUS; SUBCUTANEOUS
Refills: 0 | Status: DISCONTINUED | OUTPATIENT
Start: 2025-07-09 | End: 2025-07-09 | Stop reason: HOSPADM

## 2025-07-09 RX ORDER — NALOXONE HCL 0.4 MG/ML
0.1 VIAL (ML) INJECTION
Status: DISCONTINUED | OUTPATIENT
Start: 2025-07-09 | End: 2025-07-10 | Stop reason: HOSPADM

## 2025-07-09 RX ORDER — SODIUM CHLORIDE 0.9 % (FLUSH) 0.9 %
3-10 SYRINGE (ML) INJECTION AS NEEDED
Status: DISCONTINUED | OUTPATIENT
Start: 2025-07-09 | End: 2025-07-09 | Stop reason: HOSPADM

## 2025-07-09 RX ORDER — PROMETHAZINE HYDROCHLORIDE 25 MG/1
25 TABLET ORAL ONCE AS NEEDED
Status: DISCONTINUED | OUTPATIENT
Start: 2025-07-09 | End: 2025-07-09 | Stop reason: HOSPADM

## 2025-07-09 RX ORDER — SCOPOLAMINE 1 MG/3D
1 PATCH, EXTENDED RELEASE TRANSDERMAL ONCE
Status: DISCONTINUED | OUTPATIENT
Start: 2025-07-09 | End: 2025-07-09 | Stop reason: SDUPTHER

## 2025-07-09 RX ORDER — SODIUM CHLORIDE 0.9 % (FLUSH) 0.9 %
10 SYRINGE (ML) INJECTION AS NEEDED
Status: DISCONTINUED | OUTPATIENT
Start: 2025-07-09 | End: 2025-07-09 | Stop reason: HOSPADM

## 2025-07-09 RX ORDER — THIAMINE HYDROCHLORIDE 100 MG/ML
100 INJECTION, SOLUTION INTRAMUSCULAR; INTRAVENOUS ONCE
Status: COMPLETED | OUTPATIENT
Start: 2025-07-09 | End: 2025-07-09

## 2025-07-09 RX ORDER — HYOSCYAMINE SULFATE 0.12 MG/1
125 TABLET SUBLINGUAL EVERY 6 HOURS PRN
Status: DISCONTINUED | OUTPATIENT
Start: 2025-07-09 | End: 2025-07-10 | Stop reason: HOSPADM

## 2025-07-09 RX ORDER — ONDANSETRON 2 MG/ML
INJECTION INTRAMUSCULAR; INTRAVENOUS AS NEEDED
Status: DISCONTINUED | OUTPATIENT
Start: 2025-07-09 | End: 2025-07-09 | Stop reason: SURG

## 2025-07-09 RX ORDER — FENTANYL CITRATE 50 UG/ML
50 INJECTION, SOLUTION INTRAMUSCULAR; INTRAVENOUS
Status: DISCONTINUED | OUTPATIENT
Start: 2025-07-09 | End: 2025-07-09 | Stop reason: HOSPADM

## 2025-07-09 RX ORDER — HYDROMORPHONE HYDROCHLORIDE 2 MG/1
2 TABLET ORAL EVERY 4 HOURS PRN
Refills: 0 | Status: DISCONTINUED | OUTPATIENT
Start: 2025-07-09 | End: 2025-07-10 | Stop reason: HOSPADM

## 2025-07-09 RX ORDER — ONDANSETRON 4 MG/1
4 TABLET, ORALLY DISINTEGRATING ORAL EVERY 6 HOURS PRN
Status: DISCONTINUED | OUTPATIENT
Start: 2025-07-09 | End: 2025-07-10 | Stop reason: HOSPADM

## 2025-07-09 RX ORDER — METOPROLOL SUCCINATE 50 MG/1
50 TABLET, EXTENDED RELEASE ORAL DAILY
Status: DISCONTINUED | OUTPATIENT
Start: 2025-07-10 | End: 2025-07-10 | Stop reason: HOSPADM

## 2025-07-09 RX ORDER — MIDAZOLAM HYDROCHLORIDE 1 MG/ML
2 INJECTION, SOLUTION INTRAMUSCULAR; INTRAVENOUS ONCE
Status: COMPLETED | OUTPATIENT
Start: 2025-07-09 | End: 2025-07-09

## 2025-07-09 RX ORDER — HYDRALAZINE HYDROCHLORIDE 20 MG/ML
20-30 INJECTION INTRAMUSCULAR; INTRAVENOUS
Status: DISCONTINUED | OUTPATIENT
Start: 2025-07-09 | End: 2025-07-10 | Stop reason: HOSPADM

## 2025-07-09 RX ORDER — MIDAZOLAM HYDROCHLORIDE 1 MG/ML
INJECTION, SOLUTION INTRAMUSCULAR; INTRAVENOUS AS NEEDED
Status: DISCONTINUED | OUTPATIENT
Start: 2025-07-09 | End: 2025-07-09 | Stop reason: SURG

## 2025-07-09 RX ORDER — METOCLOPRAMIDE 10 MG/1
10 TABLET ORAL EVERY 6 HOURS PRN
Status: DISCONTINUED | OUTPATIENT
Start: 2025-07-09 | End: 2025-07-10 | Stop reason: HOSPADM

## 2025-07-09 RX ORDER — CHLORHEXIDINE GLUCONATE ORAL RINSE 1.2 MG/ML
15 SOLUTION DENTAL SEE ADMIN INSTRUCTIONS
Status: COMPLETED | OUTPATIENT
Start: 2025-07-09 | End: 2025-07-09

## 2025-07-09 RX ORDER — ACETAMINOPHEN 10 MG/ML
1000 INJECTION, SOLUTION INTRAVENOUS ONCE
Status: COMPLETED | OUTPATIENT
Start: 2025-07-09 | End: 2025-07-09

## 2025-07-09 RX ORDER — TRANEXAMIC ACID 10 MG/ML
INJECTION, SOLUTION INTRAVENOUS AS NEEDED
Status: DISCONTINUED | OUTPATIENT
Start: 2025-07-09 | End: 2025-07-09 | Stop reason: SURG

## 2025-07-09 RX ORDER — ACETAMINOPHEN 500 MG
1000 TABLET ORAL EVERY 6 HOURS
Status: DISCONTINUED | OUTPATIENT
Start: 2025-07-09 | End: 2025-07-10 | Stop reason: HOSPADM

## 2025-07-09 RX ORDER — ONDANSETRON 2 MG/ML
4 INJECTION INTRAMUSCULAR; INTRAVENOUS ONCE AS NEEDED
Status: DISCONTINUED | OUTPATIENT
Start: 2025-07-09 | End: 2025-07-09 | Stop reason: HOSPADM

## 2025-07-09 RX ORDER — SODIUM CHLORIDE, SODIUM LACTATE, POTASSIUM CHLORIDE, CALCIUM CHLORIDE 600; 310; 30; 20 MG/100ML; MG/100ML; MG/100ML; MG/100ML
1000 INJECTION, SOLUTION INTRAVENOUS ONCE
Status: COMPLETED | OUTPATIENT
Start: 2025-07-09 | End: 2025-07-09

## 2025-07-09 RX ORDER — LOSARTAN POTASSIUM 50 MG/1
50 TABLET ORAL DAILY
Status: DISCONTINUED | OUTPATIENT
Start: 2025-07-09 | End: 2025-07-10 | Stop reason: HOSPADM

## 2025-07-09 RX ORDER — SODIUM CHLORIDE, SODIUM LACTATE, POTASSIUM CHLORIDE, CALCIUM CHLORIDE 600; 310; 30; 20 MG/100ML; MG/100ML; MG/100ML; MG/100ML
9 INJECTION, SOLUTION INTRAVENOUS CONTINUOUS PRN
Status: DISCONTINUED | OUTPATIENT
Start: 2025-07-09 | End: 2025-07-09 | Stop reason: HOSPADM

## 2025-07-09 RX ORDER — FENTANYL CITRATE 50 UG/ML
INJECTION, SOLUTION INTRAMUSCULAR; INTRAVENOUS AS NEEDED
Status: DISCONTINUED | OUTPATIENT
Start: 2025-07-09 | End: 2025-07-09 | Stop reason: SURG

## 2025-07-09 RX ORDER — CYANOCOBALAMIN 1000 UG/ML
1000 INJECTION, SOLUTION INTRAMUSCULAR; SUBCUTANEOUS ONCE
Status: COMPLETED | OUTPATIENT
Start: 2025-07-10 | End: 2025-07-10

## 2025-07-09 RX ORDER — ROCURONIUM BROMIDE 10 MG/ML
INJECTION, SOLUTION INTRAVENOUS AS NEEDED
Status: DISCONTINUED | OUTPATIENT
Start: 2025-07-09 | End: 2025-07-09 | Stop reason: SURG

## 2025-07-09 RX ORDER — PROMETHAZINE HYDROCHLORIDE 25 MG/1
25 SUPPOSITORY RECTAL ONCE AS NEEDED
Status: DISCONTINUED | OUTPATIENT
Start: 2025-07-09 | End: 2025-07-09 | Stop reason: HOSPADM

## 2025-07-09 RX ORDER — PROMETHAZINE HYDROCHLORIDE 12.5 MG/1
12.5 SUPPOSITORY RECTAL EVERY 6 HOURS PRN
Status: DISCONTINUED | OUTPATIENT
Start: 2025-07-09 | End: 2025-07-10 | Stop reason: HOSPADM

## 2025-07-09 RX ADMIN — HYDROMORPHONE HYDROCHLORIDE 0.5 MG: 1 INJECTION, SOLUTION INTRAMUSCULAR; INTRAVENOUS; SUBCUTANEOUS at 12:54

## 2025-07-09 RX ADMIN — SODIUM CHLORIDE, SODIUM LACTATE, POTASSIUM CHLORIDE, AND CALCIUM CHLORIDE: .6; .31; .03; .02 INJECTION, SOLUTION INTRAVENOUS at 11:42

## 2025-07-09 RX ADMIN — TRANEXAMIC ACID 1000 MG: 10 INJECTION, SOLUTION INTRAVENOUS at 11:59

## 2025-07-09 RX ADMIN — Medication 3 ML: at 20:53

## 2025-07-09 RX ADMIN — FENTANYL CITRATE 50 MCG: 50 INJECTION, SOLUTION INTRAMUSCULAR; INTRAVENOUS at 11:49

## 2025-07-09 RX ADMIN — THIAMINE HYDROCHLORIDE 100 MG: 100 INJECTION, SOLUTION INTRAMUSCULAR; INTRAVENOUS at 16:20

## 2025-07-09 RX ADMIN — FENTANYL CITRATE 50 MCG: 50 INJECTION, SOLUTION INTRAMUSCULAR; INTRAVENOUS at 13:49

## 2025-07-09 RX ADMIN — Medication 50 MG: at 12:00

## 2025-07-09 RX ADMIN — HYDROMORPHONE HYDROCHLORIDE 0.5 MG: 1 INJECTION, SOLUTION INTRAMUSCULAR; INTRAVENOUS; SUBCUTANEOUS at 14:07

## 2025-07-09 RX ADMIN — ACETAMINOPHEN 1000 MG: 500 TABLET, FILM COATED ORAL at 16:21

## 2025-07-09 RX ADMIN — PANTOPRAZOLE SODIUM 40 MG: 40 INJECTION, POWDER, FOR SOLUTION INTRAVENOUS at 09:06

## 2025-07-09 RX ADMIN — ONDANSETRON 4 MG: 2 INJECTION INTRAMUSCULAR; INTRAVENOUS at 12:46

## 2025-07-09 RX ADMIN — ACETAMINOPHEN 1000 MG: 10 INJECTION, SOLUTION INTRAVENOUS at 09:24

## 2025-07-09 RX ADMIN — MIDAZOLAM 2 MG: 1 INJECTION INTRAMUSCULAR; INTRAVENOUS at 09:22

## 2025-07-09 RX ADMIN — LOSARTAN POTASSIUM 50 MG: 50 TABLET, FILM COATED ORAL at 16:20

## 2025-07-09 RX ADMIN — SUGAMMADEX 300 MG: 100 INJECTION, SOLUTION INTRAVENOUS at 12:53

## 2025-07-09 RX ADMIN — SODIUM CHLORIDE, POTASSIUM CHLORIDE, SODIUM LACTATE AND CALCIUM CHLORIDE 1000 ML: 600; 310; 30; 20 INJECTION, SOLUTION INTRAVENOUS at 09:06

## 2025-07-09 RX ADMIN — LIDOCAINE HYDROCHLORIDE 100 MG: 20 INJECTION, SOLUTION EPIDURAL; INFILTRATION; INTRACAUDAL; PERINEURAL at 11:49

## 2025-07-09 RX ADMIN — ROCURONIUM BROMIDE 30 MG: 10 INJECTION INTRAVENOUS at 12:22

## 2025-07-09 RX ADMIN — HYDROMORPHONE HYDROCHLORIDE 0.5 MG: 1 INJECTION, SOLUTION INTRAMUSCULAR; INTRAVENOUS; SUBCUTANEOUS at 13:36

## 2025-07-09 RX ADMIN — PROPOFOL 135 MCG/KG/MIN: 10 INJECTION, EMULSION INTRAVENOUS at 11:55

## 2025-07-09 RX ADMIN — HYDROMORPHONE HYDROCHLORIDE 0.5 MG: 1 INJECTION, SOLUTION INTRAMUSCULAR; INTRAVENOUS; SUBCUTANEOUS at 13:06

## 2025-07-09 RX ADMIN — FENTANYL CITRATE 50 MCG: 50 INJECTION, SOLUTION INTRAMUSCULAR; INTRAVENOUS at 12:43

## 2025-07-09 RX ADMIN — SODIUM CHLORIDE 3 G: 900 INJECTION INTRAVENOUS at 11:34

## 2025-07-09 RX ADMIN — HYOSCYAMINE SULFATE 125 MCG: 0.12 TABLET SUBLINGUAL at 14:34

## 2025-07-09 RX ADMIN — PROPOFOL 200 MG: 10 INJECTION, EMULSION INTRAVENOUS at 11:49

## 2025-07-09 RX ADMIN — HYDROMORPHONE HYDROCHLORIDE 0.5 MG: 1 INJECTION, SOLUTION INTRAMUSCULAR; INTRAVENOUS; SUBCUTANEOUS at 13:21

## 2025-07-09 RX ADMIN — SCOPOLAMINE 1 PATCH: 1.5 PATCH, EXTENDED RELEASE TRANSDERMAL at 09:07

## 2025-07-09 RX ADMIN — SODIUM CHLORIDE, POTASSIUM CHLORIDE, SODIUM LACTATE AND CALCIUM CHLORIDE 100 ML/HR: 600; 310; 30; 20 INJECTION, SOLUTION INTRAVENOUS at 23:29

## 2025-07-09 RX ADMIN — MIDAZOLAM 2 MG: 1 INJECTION INTRAMUSCULAR; INTRAVENOUS at 11:42

## 2025-07-09 RX ADMIN — HYOSCYAMINE SULFATE 125 MCG: 0.12 TABLET SUBLINGUAL at 23:31

## 2025-07-09 RX ADMIN — ROCURONIUM BROMIDE 50 MG: 10 INJECTION INTRAVENOUS at 11:49

## 2025-07-09 RX ADMIN — CHLORHEXIDINE GLUCONATE 15 ML: 1.2 RINSE ORAL at 09:07

## 2025-07-09 RX ADMIN — FAMOTIDINE 20 MG: 10 INJECTION INTRAVENOUS at 22:40

## 2025-07-09 RX ADMIN — DEXAMETHASONE SODIUM PHOSPHATE 4 MG: 4 INJECTION, SOLUTION INTRA-ARTICULAR; INTRALESIONAL; INTRAMUSCULAR; INTRAVENOUS; SOFT TISSUE at 12:00

## 2025-07-09 RX ADMIN — ACETAMINOPHEN 1000 MG: 500 TABLET, FILM COATED ORAL at 22:40

## 2025-07-09 NOTE — ANESTHESIA POSTPROCEDURE EVALUATION
Patient: Manish Castillo    Procedure Summary       Date: 07/09/25 Room / Location: [facility] OR 08 / [facility] MAIN OR    Anesthesia Start: 1142 Anesthesia Stop: 1314    Procedure: GASTRIC SLEEVE LAPAROSCOPIC WITH DAVINCI ROBOT (Abdomen) Diagnosis:       Obesity, Class III, BMI 40-49.9 (morbid obesity)      (Obesity, Class III, BMI 40-49.9 (morbid obesity) [E66.01])      (Type 1 diabetes mellitus with morbid obesity [E10.69, E66.01])    Surgeons: Little Carver MD Provider: Jeronimo Silverio MD    Anesthesia Type: general, ERAS Protocol ASA Status: 3            Anesthesia Type: general, ERAS Protocol    Vitals  Vitals Value Taken Time   /99 07/09/25 14:36   Temp 97.8 °F (36.6 °C) 07/09/25 14:36   Pulse 82 07/09/25 14:36   Resp 14 07/09/25 14:36   SpO2 93 % 07/09/25 14:36           Post Anesthesia Care and Evaluation    Patient location during evaluation: PACU  Patient participation: complete - patient participated  Level of consciousness: awake  Pain scale: See nurse's notes for pain score.  Pain management: adequate    Airway patency: patent  Anesthetic complications: No anesthetic complications  PONV Status: none  Cardiovascular status: acceptable  Respiratory status: acceptable and spontaneous ventilation  Hydration status: acceptable    Comments: Patient seen and examined postoperatively; vital signs stable; SpO2 greater than or equal to 90%; cardiopulmonary status stable; nausea/vomiting adequately controlled; pain adequately controlled; no apparent anesthesia complications; patient discharged from anesthesia care when discharge criteria were met

## 2025-07-09 NOTE — PLAN OF CARE
Goal Outcome Evaluation:                            New admission to [facility], stable, has walked around the unit. Able to make needs known, plan of care ongoing.

## 2025-07-09 NOTE — OP NOTE
PREOPERATIVE DIAGNOSIS:  Class 3with multiple comorbidities as referenced in the most recent history and physical. Body mass index is 42.03 kg/m².      POSTOPERATIVE DIAGNOSIS:  Class 3 with multiple comorbidities as referenced in the most recent history and physical.Body mass index is 42.03 kg/m².      PROCEDURES PERFORMED:  1.  Robotic assisted laparoscopic sleeve gastrectomy   SURGEON:  Little Carver MD FACS, FAMBS    ASSISTANT:  Assistant: Yohannes Fried CSA    Surgery assisted and facilitated by a certified assistant, who directly resulted in a decreased operative time, anesthetic time, wound exposure, and possibly of an operative wound infection, thereby decreasing patient morbidity and ultimately total expenditures.  The surgical assistant assisted in placement of trochars, take down of the gastrocolic omentum, short gastric vessels and dissection at the angle of His.  Also assisted in retraction of the stomach during stapling so as not to kink the gastric sleeve.  Also assisted in removing of the gastric specimen, closure of the fascial defect as well as closure of the skin incisions.    ANESTHESIA:  General endotracheal.    ESTIMATED BLOOD LOSS:   Less than 25 mL unless dictated below.    FLUIDS:  Crystalloids.    SPECIMENS:  Gastric remnant    DRAINS:  None.    Implant Name Type Inv. Item Serial No.  Lot No. LRB No. Used Action   DEV CLS WND VLOC/180 ZACKERY ABS 1/2CIR V20 SZ3/0 26MM 30CM GRN - [id number] Implant DEV CLS WND VLOC/180 ZACKERY ABS 1/2CIR V20 SZ3/0 26MM 30CM GRN  COVIDIEN [id number] N/A 1 Implanted   RELOAD STPLR TISS SUREFORM/60 DAVINCI/X/XI 6ROW 2.5 WHT 1P/U - [id number] Implant RELOAD STPLR TISS SUREFORM/60 DAVINCI/X/XI 6ROW 2.5 WHT 1P/U  INTUITIVE SURGICAL [id number] N/A 2 Implanted   RELOAD STPLR TISS SUREFORM/60 DAVINCI/X/XI 6ROW 3.5 MORA 1P/U - [id number] Implant RELOAD STPLR TISS SUREFORM/60 DAVINCI/X/XI 6ROW 3.5 MORA 1P/U  INTUITIVE SURGICAL [id number] N/A 2 Implanted   RELOAD  STPLR TISS SUREFORM/60 DAVINCI/X/XI 6ROW 2.5 WHT 1P/U - [id number] Implant RELOAD STPLR TISS SUREFORM/60 DAVINCI/X/XI 6ROW 2.5 WHT 1P/U  INTUITIVE SURGICAL [id number] N/A 2 Implanted       COUNTS:  Correct.    COMPLICATIONS:  None.    INDICATIONS:  This patient with Class 3 obesity and associated comorbidities presents for elective laparoscopic, possible open sleeve gastrectomy.  The patient has received medical clearance to proceed.  The patient has undergone our extensive educational process and consent process and wishes to proceed.        DESCRIPTION OF PROCEDURE:  The patient was brought to the operating room and placed supine upon the operating room table. SCD hose were placed.  The patient underwent uneventful general endotracheal anesthesia per the anesthesiology staff. The abdomen was prepped with ChloraPrep and draped in the usual sterile fashion.  An Ioban was used as well if not allergic.  Depending on the technique to size the gastric sleeve, anesthesia staff either passed a 40-[place] citizen ViSiGi bougie into the stomach to decompress and then was pulled back to the esophagus.     A small incision was made in the left upper quadrant near the costal margin at Calvert's point for the Veress needle.  The Veress needle was inserted into the peritoneum and insufflation commenced to 15 mmHg.       An 8 mm transverse incision was made at the left midclavicular line about 15 cm inferior to the xiphoid.  The peritoneal cavity was entered under direct camera visualization using a 5  mm 0° laparoscope and an Optiview trocar.  The abdomen was then insufflated to a pressure of 15-16 mmHg with CO2 gas.  Exploratory laparoscopy revealed no evidence of injury from the entrance technique and no significant abnormalities unless addendum dictated below.    The patient was placed in reverse Trendelenburg position.   A 12 mm trocar was placed in the right abdomen.  I then changed the scope to a 30 degree da Wendi scope.  Under  direct camera visualization two 8 mm trocar was placed in the left lateral midabdominal position.        Next the 30 degree 8 mm scope was brought into the 8 mm port just to the left of the umbilicus and the corresponding trocar was docked to the da Wendi robot.  Targeting then took place and then the rest of the trochars were docked to the robot and angled into position.  Instruments were brought in through the trochars in place and into view.          I then took control of the surgical console.The gastrocolic omentum was divided with the Vessel Sealer and this proceeded superiorly to the angle of His taking down the short gastric vessels.  All posterior attachments of the lesser sac and posterior aspect of the stomach to the pancreas were taken down as well.          Dissection then proceeded medially taking down the greater curvature with the vessel sealer to just proximal to the pylorus.  The 40-Armenian orogastric tube was passed back down into the stomach for decompression and later to aid in sizing the sleeve.       I then used the Surefire stapler and a blue load was used to create the gastric sleeve.   There were additional firings to complete the gastric sleeve near the angle of Hiss, see above.  ICG was used for a leak test by insufflating ICG into the orogastric tube and the staple line was closely inspected under firefly and there was no evidence of leak.    An omentopexy was performed of the entire staple line of the gastric sleeve using a 3-0 V-Loc 180.  The specimen was removed through the 12 mm port site.   The fascia at the 12 mm trocar site incision that was used for extraction was closed with a single 0 Vicryl suture in a figure-of-eight fashion placed under direct laparoscopic camera visualization with a suture passer and tying the knot extracorporeally.  The fascia in the area was infiltrated with local anesthesia. All incisions were then infiltrated with local anesthetic. The remaining trocars  were removed under direct camera visualization with no bleeding noted from their sites.  The abdomen was desufflated of gas. The skin in each incision was closed using 4-0 antibiotic impregnated Monocryl in a subcuticular fashion followed by Dermabond.  The patient tolerated the procedure well without complication and was taken to the recovery room in stable condition.  All sponge, needle and instrument counts were correct.

## 2025-07-09 NOTE — ANESTHESIA PROCEDURE NOTES
Airway  Reason: elective    Date/Time: 7/9/2025 11:51 AM  Airway not difficult    General Information and Staff    Patient location during procedure: OR  CRNA/CAA: Oc Perez CRNA    Indications and Patient Condition  Indications for airway management: airway protection    Preoxygenated: yes    Mask difficulty assessment: 1 - vent by mask    Final Airway Details    Final airway type: endotracheal airway      Successful airway: ETT  Cuffed: yes   Successful intubation technique: video laryngoscopy  Adjuncts used in placement: intubating stylet  Endotracheal tube insertion site: oral  Blade: Casas  Blade size: 3  ETT size (mm): 8.0  Cormack-Lehane Classification: grade I - full view of glottis  Placement verified by: capnometry   Measured from: lips  ETT/EBT  to lips (cm): 24  Number of attempts at approach: 1  Assessment: lips, teeth, and gum same as pre-op and atraumatic intubation

## 2025-07-09 NOTE — H&P
Bariatric Consult:    Chief Complaint: Morbid Obesity  Referred by Provider, No Known    Manish Castillo is here today for consult on Morbid Obesity    History of Present Illness:     Manish Castillo is a 44 y.o. male with class morbid obesity with co-morbidities including  has a past medical history of Constipation, Depression, Episodic mood disorder, Exercise-induced leg cramps, GERD (gastroesophageal reflux disease), Hair loss, Heartburn, History of psychiatric hospitalization, Hypertension, Marijuana user, Panic attacks, Pre-diabetes, PTSD (post-traumatic stress disorder), Shortness of breath on exertion, Sleep apnea, Substance abuse, Tired, and Weight gain.  who presents for surgical consultation for the above procedure. Manish has completed the initial intake visit and has been examined by our nurse practitioner, dietician, psychologist and underwent the extensive educational teaching process under the guidance of our bariatric coordinator and myself. Manish also has seen the educational video SABA on the surgical procedure if available. Manish attended today more educational teaching from our bariatric coordinator and myself. Manish has had an extensive medical workup including a visit with their primary care physician, EKG, chest radiograph, blood work, EGD or UGI and possibly further testing. These have been reviewed by me and discussed with the patient. Manish is now ready to proceed with surgery. Manish presently denies nausea, vomiting, fever, chills, chest pain, shortness of air, melena, hematochezia, hemetemesis, dysuria, frequency, hematuria, jaundice or abdominal pain.     Wt Readings from Last 10 Encounters:   07/09/25 (!) 153 kg (336 lb 4.8 oz)   06/18/25 (!) 156 kg (344 lb)   06/04/25 (!) 158 kg (349 lb)   05/23/25 (!) 160 kg (352 lb 3.2 oz)   04/04/25 (!) 160 kg (352 lb)   03/05/25 (!) 160 kg (353 lb)   02/03/25 (!) 159 kg (350 lb)   01/13/25 (!) 158 kg (348 lb)   12/16/24 (!) 157 kg (346 lb)    11/13/24 (!) 157 kg (346 lb)       The  pre-op EGD shows   normal    Past Medical History:   Diagnosis Date    Constipation     Depression     Episodic mood disorder     Exercise-induced leg cramps     GERD (gastroesophageal reflux disease)     Hair loss     Heartburn     History of psychiatric hospitalization     Hypertension     Marijuana user     sober since 7/2024    Panic attacks     Pre-diabetes     PTSD (post-traumatic stress disorder)     Shortness of breath on exertion     Sleep apnea     NO MASK     Substance abuse     past and present per packet    Tired     Weight gain        @DXC@    Past Surgical History:   Procedure Laterality Date    CARDIAC CATHETERIZATION N/A 1/27/2021    Procedure: Left Heart Cath;  Surgeon: Darren Nice MD;  Location: [facility] CATH INVASIVE LOCATION;  Service: Cardiovascular;  Laterality: N/A;    ENDOSCOPY N/A 7/1/2021    Procedure: ESOPHAGOGASTRODUODENOSCOPY with biopsy;  Surgeon: Little Carver MD;  Location: [facility] ENDOSCOPY;  Service: General;  Laterality: N/A;  post : normal anatomy with biopsy    MOUTH SURGERY      TONSILLECTOMY  1993    WRIST SURGERY      lead from pencil removed from wrist         Obesity, Class III, BMI 40-49.9 (morbid obesity)      Allergies   Allergen Reactions    Aspirin Other (See Comments)     Must be coated aspirin.          Current Facility-Administered Medications:     ceFAZolin 3000 mg IVPB in 100 mL NS (MBP), 3 g, Intravenous, Once, Little Carver MD    lactated ringers infusion, 9 mL/hr, Intravenous, Continuous PRN, Jeronimo Silverio MD    lactated ringers infusion, 100 mL/hr, Intravenous, Continuous, Little Carver MD    lidocaine PF 1% (XYLOCAINE) injection 0.5 mL, 0.5 mL, Intradermal, Once PRN, Little Carver MD    scopolamine patch 1 mg/72 hr, 1 patch, Transdermal, Continuous, Little Carver MD, 1 patch at 07/09/25 0907    sodium chloride 0.9 % flush 10 mL, 10 mL, Intravenous, Q12H, Jeronimo Silverio MD    sodium chloride 0.9 %  flush 10 mL, 10 mL, Intravenous, Nusrat HAQUE Thomas Andrew, MD    sodium chloride 0.9 % flush 10 mL, 10 mL, Intravenous, Mehul HAQUE Lanny, MD    sodium chloride 0.9 % flush 3-10 mL, 3-10 mL, Intravenous, Mehul HAQUE Lanny, MD    Social History     Socioeconomic History    Marital status: Single   Tobacco Use    Smoking status: Former     Current packs/day: 0.00     Types: Cigarettes     Quit date:      Years since quittin.5     Passive exposure: Past    Smokeless tobacco: Never   Vaping Use    Vaping status: Former    Substances: Nicotine, THC   Substance and Sexual Activity    Alcohol use: Not Currently     Alcohol/week: 12.0 standard drinks of alcohol     Types: 12 Cans of beer per week     Comment: daily    Drug use: Not Currently     Frequency: 7.0 times per week     Types: Marijuana     Comment: stopped for sx 24    Sexual activity: Defer       Family History   Problem Relation Age of Onset    Heart disease Mother     Hypertension Mother     Sleep apnea Mother     Diabetes Paternal Grandmother        Review of Systems:  Review of Systems    Review of Systems   Constitutional: Negative.    HENT: Negative.    Eyes: Negative.    Respiratory: Negative.    Cardiovascular: Negative.    Gastrointestinal: Negative.    Endocrine: Negative.    Genitourinary: Negative.    Musculoskeletal: Negative.    Skin: Negative.    Allergic/Immunologic: Negative.    Neurological: Negative.    Hematological: Negative.    Psychiatric/Behavioral: Negative.      Physical Exam:  Body mass index is 42.03 kg/m².   Vital Signs:  Weight: (!) 153 kg (336 lb 4.8 oz)   Body mass index is 42.03 kg/m².  Temp: 98.4 °F (36.9 °C)   Heart Rate: 87   BP: 134/96     Awake and alert  Normal mental status  Normal pulmonary effort  Abdomen appropriate tenderness  Incisions no erythema  Extremities no tenderness or swelling      Assessment:  Patient Active Problem List   Diagnosis    Morbid obesity    BMI 45.0-49.9, adult    Pre-operative  examination    Dyspnea on exertion    Preoperative cardiovascular examination    Abnormal EKG    Essential hypertension    Abnormal nuclear stress test    Obesity, Class III, BMI 40-49.9 (morbid obesity)    Pre-diabetes         Manish Castillo is a 44 y.o. year old male with class morbid obesity with a BMI of Body mass index is 42.03 kg/m². and multiple co-morbidities listed in the encounter diagnosis.    I think he is an appropriate candidate for this surgery, and is ready to proceed.        Plan/Discussion/Summary:        I instructed patient to start on a H2 blocker or proton pump inhibitor if not already on one of these medications.    I explained in detail the procedures that we perform.  All of these procedures have a chance to convert to open if any technical challenges or complications do occur.  Bariatric surgery is not cosmetic surgery but rather a tool to help a patient make a life-long commitment lifestyle change including diet, exercise, behavior changes, and taking supplemental vitamins and minerals.    Problems after surgery may require more operations to correct them.    The risks, benefits, alternatives, and potential complications of all of the procedures were explained in detail including, but not limited to death, anesthesia and medication adverse effect, deep venous thrombosis, pulmonary embolism, trocar site/incisional hernia, wound infection, abdominal infection, bleeding, failure to lose weight, gain weight, a change in body image, metabolic complications with vitamin deficiences and anemia.    Weight loss expectations were discussed with the patient in detail. The weight loss operations most commonly performed are the sleeve gastrectomy and the Jane-en-Y gastric bypass. These operations result in weight losses up to approximately 25-35% of initial body weight 12 to 24 months after surgery with the gastric bypass usually the higher percent of weight loss but depends on patient using the  tool.    For the gastric bypass and loop duodenal switch (SORAIDA-S) the risks include but not limited to the following early complications:  Anastomotic leak/peritonitis, Jane/Alimentary/biliopancreatic limb obstruction, severe & minor wound infection/seroma, and nausea/vomiting.  Late complications can include but are not limited to malnutrition, vitamin deficiencies, frequent loose stools,  stomal stenosis, marginal ulcer, bowel obstruction, intussusception, internal, and incisional hernia.    Regarding the gastric sleeve, there is less long-term outcome data and higher risk of dysphagia and reflux compared to a gastric bypass, as well as risk of internal visceral/organ injury, splenectomy, bleeding, infection, leak (which could require further intervention possible conversion to Jane-en-Y gastric bypass), stenosis and possibility of regaining weight.    Manish was counseled regarding diagnostic results, instructions for management, risk factor reductions, prognosis, patient and family education, impressions, risks and benefits of treatment options and importance of compliance with treatment. Total face to face time of the encounter was over 45 minutes and over 30 minutes was spent counseling.     Torrie Report   As part of this patient's treatment plan I am prescribing controlled substances. The patient has been made aware of appropriate use of such medications, including potential risk of somnolence, limited ability to drive and /or work safely, and potential for dependence or overdose. It has also been made clear that these medications are for use by this patient only, without concomitant use of alcohol or other substances unless prescribed.    Manish has completed prescribing agreement detailing terms of continued prescribing of controlled substances, including monitoring TORRIE reports, urine drug screening, and pill counts if necessary. Manish is aware that inappropriate use will result in cessation of  prescribing such medications.    TORRIE report has been reviewed      History and physical exam exhibit continued safe and appropriate use of controlled substances.      Manish understands the surgical procedures and the different surgical options that are available.  He understands the lifestyle changes that are required after surgery and has agreed to follow the guidelines outlined in the weight management program.  He also expressed understanding of the risks involved and had all of male questions answered and desires to proceed.      Little Carver MD  7/9/2025

## 2025-07-10 VITALS
RESPIRATION RATE: 20 BRPM | WEIGHT: 315 LBS | SYSTOLIC BLOOD PRESSURE: 147 MMHG | TEMPERATURE: 98.6 F | HEART RATE: 74 BPM | OXYGEN SATURATION: 98 % | BODY MASS INDEX: 39.17 KG/M2 | DIASTOLIC BLOOD PRESSURE: 87 MMHG | HEIGHT: 75 IN

## 2025-07-10 PROCEDURE — 99024 POSTOP FOLLOW-UP VISIT: CPT | Performed by: SURGERY

## 2025-07-10 PROCEDURE — 25010000002 CYANOCOBALAMIN PER 1000 MCG: Performed by: SURGERY

## 2025-07-10 PROCEDURE — 25010000002 FAMOTIDINE 10 MG/ML SOLUTION: Performed by: SURGERY

## 2025-07-10 RX ORDER — HYDROMORPHONE HYDROCHLORIDE 2 MG/1
2 TABLET ORAL EVERY 6 HOURS PRN
Qty: 18 TABLET | Refills: 0 | Status: SHIPPED | OUTPATIENT
Start: 2025-07-10

## 2025-07-10 RX ORDER — URSODIOL 250 MG/1
250 TABLET, FILM COATED ORAL 2 TIMES DAILY
Qty: 60 TABLET | Refills: 5 | Status: SHIPPED | OUTPATIENT
Start: 2025-07-10 | End: 2025-08-09

## 2025-07-10 RX ORDER — ONDANSETRON 8 MG/1
8 TABLET, ORALLY DISINTEGRATING ORAL EVERY 8 HOURS PRN
Qty: 30 TABLET | Refills: 12 | Status: SHIPPED | OUTPATIENT
Start: 2025-07-10 | End: 2025-08-09

## 2025-07-10 RX ADMIN — CYANOCOBALAMIN 1000 MCG: 1000 INJECTION, SOLUTION INTRAMUSCULAR; SUBCUTANEOUS at 08:44

## 2025-07-10 RX ADMIN — FAMOTIDINE 20 MG: 10 INJECTION INTRAVENOUS at 08:44

## 2025-07-10 RX ADMIN — METOPROLOL SUCCINATE 50 MG: 50 TABLET, EXTENDED RELEASE ORAL at 08:45

## 2025-07-10 RX ADMIN — LOSARTAN POTASSIUM 50 MG: 50 TABLET, FILM COATED ORAL at 08:44

## 2025-07-10 RX ADMIN — HYDROMORPHONE HYDROCHLORIDE 2 MG: 2 TABLET ORAL at 08:44

## 2025-07-10 RX ADMIN — ACETAMINOPHEN 1000 MG: 500 TABLET, FILM COATED ORAL at 12:04

## 2025-07-10 RX ADMIN — HYOSCYAMINE SULFATE 125 MCG: 0.12 TABLET SUBLINGUAL at 08:44

## 2025-07-10 RX ADMIN — Medication 3 ML: at 08:45

## 2025-07-10 NOTE — DISCHARGE INSTRUCTIONS
GOING HOME AFTER GASTRIC SLEEVE/ GASTRIC BYPASS SURGERY  [facility] Weight Loss: Post-Operative Information/Instructions  Little Carver MD  General Patient Instructions for Discharge   - Call Surgeon's office at [phone] for follow-up appointment.    - Be sure you, the patient, have a follow-up appointment to be seen within seven (7) days after discharge. If not, please call [phone] to schedule an appointment. If you are discharged on a Saturday or Sunday, please call Monday to schedule the appointment.  - Contact the Surgeon at [phone] for any questions or concerns, including temperature greater than or equal to 101F, shortness of breath, leg swelling, redness at incision sites, nausea, vomiting, chills, or problems or questions.      - You may shower. No tub bath for 2 weeks.  - No lifting, pushing, pulling, or tugging >25 pounds for 3 weeks.  - Ambulate every 3 hours while awake minimum for seven (7) days, increase distance daily.  - For the next several weeks, you are at an increased risk for blood clot formation. Therefore, you should walk regularly. You should not sit for prolonged periods of time, more than 45 minutes, without getting up and walking for 5-10 minutes. This includes any car rides, including the drive home from the hospital. If driving any distance greater than 30 miles over the next two (2) weeks, stop every 30-45 minutes and walk for 5-10 minutes each time.  - Continue using Incentive Spirometer and coughing exercises at least every two (2) hours while awake for one week.  - Continue use of CPAP/BIPAP for diagnosis of sleep apnea as directed.  - No driving or operating machinery allowed while taking narcotic (prescription) pain medication, and until you feel comfortable forcefully applying the brakes if needed. (This usually takes more than 3 days.)    - Make an appointment with your Primary Care Physician within one week post-op to look at your home medications for  possible changes or discontinuity.   Medications  - The nurse will provide a list of medications for you to continue at home   - If you received a Lovenox (Enoxaparin) or Apixiban (Eliquis) prescription at pre-op visit with Surgeon, start taking the medicine the evening of discharge unless directed otherwise.    - If you were prescribed Lovenox (Enoxaparin), review the education/teaching material/video with the nurse.    - Take post op pain meds as prescribed as needed.   - Begin taking Actigall (Ursodiol) one (1) week after surgery if you still have a gallbladder. Contact the office if you do not have the prescription.   - Resume bariatric vitamin one week after surgery.    - If you had a gastric bypass, take omeprazole or other proton pump inhibitor by mouth once daily for four (4) weeks unless you are already taking a proton pump inhibitor as home medication. Follow dosing instructions on package.   Nausea/Vomiting:  The following are possible causes for nausea/vomiting:  - Drinking too much or too fast.  - Sinus drainage/post nasal drip for allergy sufferers (you may take Sudafed, Claritin, Tylenol Sinus/Allergy, or other decongestants and nose sprays to help with this discomfort).  - Low blood sugar (sweating, shaky, irritable, weakness, dizzy or tunnel-vision) - treatment is to sip 100% fruit juice - no sugar added until symptoms subside.  - Acid in fruit juice - (may dilute with water or avoid).  - Eating or drinking something that is not on clear liquid (stage 1) diet.  Any nausea/vomiting that prohibits you from keeping fluids down for greater than 24 hours requires a call to the surgeon's office.  Urine:  Use your urine color as a guide to determine if you are drinking enough fluid. The darker the urine, the more fluids you need to drink. Urine should be clear to light yellow if you are getting enough fluid. If you should experience frequency, burning or pain with urination, blood in urine, contact us or  your primary care physician for possible UTI (urinary tract infection), which could require antibiotics (liquid preferred).  Bowel Movements:  You may not have a bowel movement for 2-5 days after going home. You may then experience liquid, runny or loose stools for approximately 3-4 weeks following surgery. This would require you to drink even more fluids to prevent dehydration. Some patients may experience constipation, which can be treated with increased fluids, drinking warm liquids, increased activity and the use of a Fleets Enema, Milk of Magnesia, or suppositories. The first couple of bowel movements could be bloody, tarry black or dark maroon in color. This is OK as long as the stool returns to a normal color in 1-2 days. If however, you have frequent or a large amount of bloody or tarry black stools and/or become light-headed or dizzy, you may be bleeding and require urgent attention. Please call us right away.  Abdominal Incisions:  You will have small incisions. Do not scrub incisions, but allow the warm, soapy water to run over the incisions, rinse well, and pat dry. You may use any brand of anti-bacterial soap. Do not use Peroxide or Neosporin type ointments on sites, unless instructed to do so by a surgeon or nurse. Monitor daily for signs/symptoms of infection, which might include: drainage with a foul odor, pain, redness, swelling or heat at the incision sight; fever, body aches and chills. If you suspect infection or have a fever, give us a call.  Pain:  You will be given a prescription for pain medication to control your pain. If you feel the dose is too strong, you may take half the ordered dose, or you may take Tylenol adult liquid per package instructions for minor pain. Do not take any medications that contain aspirin or aspirin products.  Do not take medications like: Motrin, Aleve, Ibuprofen, Advil, Naproxen, Celebrex, Daypro, Bextra, Meloxicam or other medications commonly used for arthritis  or joint pain.  No steroids or cortisone injections. There may be pain, which should improve every few days. Pain should not suddenly get worse or more intense. Pain that suddenly changes and is constant and severe should be called in to the surgeon's office. Any sudden pain in the lower extremities with associated warmth and redness should be called in to the surgeon's office immediately. Do not rub or massage this area, as it could be a blood clot.  Diet:    POST OP DIET PROGRESSION FOR SLEEVE AND BYPASS  Stage 1 Clear Liquids - Days 0-1    Gatorade Zero, PowerAde Zero, Sugar free Jell-O, Sugar free popsicles, broth, water, decaf tea, decaf coffee, Crystal light, [place], Dasani drops, sugar free Joe-Aid, Vitamin Water zero, Propel zero   Stage 2 Full Liquids - Days 2-14   Continue with stage 1 and add:  Skim milk or unsweetened milk alternative such as soymilk, almond milk or oat milk   Chobani less sugar Greek yogurt without chunks, Yoplait light yogurt, Dannon Light & fit original Greek yogurt, Dannon light & fit original or any blended low-fat yogurt with less than 12g of carbs  Low fat cream soups - must be strained, not blended (no chunks)  Sugar free low-fat pudding, no sugar added applesauce   Mashed Potatoes VERY THIN made with skim milk or broth (soup consistency)  Cream of Wheat or Girts - VERY THIN made with skim milk (soup consistency)  Protein powders made with milks listed above or any ready to drink protein shakes that meets calories and protein guidelines   Stage 3 Semi Solid - Days 15-21   Continue with stages 1-2 and add:  Scrambled eggs, low-fat or fat free cottage cheese, low- fat ricotta cheese, fat-free refried beans, oatmeal, canned peaches and pears (packed in water), soft banana, PB2 powdered peanut butter  Protein smoothies: can add fruit to shakes    Stage 4 Soft Foods - Days 22-28   Continue with stages 1 - 3 and add:  Soft cooked vegetables, cooked beans, bananas, canned fruit (fork  mashed) in juice or water, Canned chicken or tuna (fork mashed) canned in water, lean ground turkey or chicken, low-fat cheese   Stage 5 Regular Diet - Days 29+   Continue with stages 1 - 4 and add:  Chicken, turkey, ham, turkey pyle/sausage, lean ground beef, crackers, tortillas, toast, fresh fruit, salad, vegetables, jerky, nuts. Slowly add in small portions of new foods.                                                                                                                                                                 It may take longer to tolerate some foods. You may need to try to avoid the following until 3 months post op: untoasted bread, dry chicken or other meat, pasta, rice, stringy vegetables such as celery or asparagus, fruit with skin     Medications:  The nurse will let you know which medications you will need to continue once you go home. Do not take any medications that are extended or time released if you had the gastric bypass procedure, OK to take if you had the gastric sleeve procedure. Large capsules can be opened and diluted with clear liquids. Check with your physician or pharmacist as to which pills may be crushed and which capsules may be opened and diluted safely. If you still have your gall bladder and were prescribed Actigall (Ursodiol), you may resume this medication one week after your surgery. You will remain on Actigall (Ursodiol) for approximately 6 months. The dose is 1 pill, 2 times each day for 6 months.  Activity:  Continue your deep breathing and coughing exercises with your Incentive Spirometer breathing device at least every 3 hours while awake (10 repetitions each time) for one week. May use CPAP. This will help to prevent respiratory problems such as pneumonia. No lifting, pulling or tugging anything over 25 pounds for 3 weeks after surgery. You may shower but no tub baths, hot tubs or swimming for 2 weeks. Moderate walking is recommended every 3 hours while awake  minimum, increase distance daily. Further exercise will be discussed at the first post-op visit. No driving or operating machinery allow until off narcotic pain medication and until you feel comfortable forcefully applying the brakes (usually takes 3 or more days). For the next few weeks you are at an increased risk for blood clot formation. Therefore you should walk regularly and you should not sit for prolonged periods of time, more than 45 minutes without getting up and walking for 5-10 minutes. This includes car rides. Including riding home from the hospital. If riding a distance greater than 30 miles over the next 2 weeks stop every 30-45 minutes and walk 5-10 mintues each time. No tanning bed use for 8 weeks after surgery and in general, not recommended due to the increased risk for skin cancer. Incisions will burn/blister very badly with tanning bed use.  Illness:  Your primary care physician should treat general illness such as ear infections, sinus infections, and viral type illnesses, etc. Medications prescribed should be liquid/elixir form when possible, for the first 30 days.  General:  In general, it is recommended that you weigh yourself no more than once per week. Let the weight come off you and concentrate on more important things. Remember the weight was not gained overnight, nor will it be lost overnight. Gastric Bypass/ Gastric Sleeve weight loss will continue over a period of 12-18 months. Do not  yourself according to how others are doing after surgery, as this will cause unnecessary discouragement.  THE ABOVE ARE GENERAL GUIDELINES TO ASSIST YOU ONCE HOME, IF YOU ARE IN DOUBT, OR YOU HAVE ANY QUESTIONS, CALL US AT THE NUMBERS LISTED BELOW.  IN THE EVENT OF SUDDEN CHEST PAIN, SHORTNESS OF BREATH, OR ANY LIFE THREATENING CONDITION, CALL 911.  Any time you are evaluated or admitted to another facility, please have someone notify the surgeon's office.  Supplements:  70 grams of protein taken  EVERY DAY. Remember to drink at least 64 ounces of fluid a day, sipping slowly early on. Increase this amount during the summertime. Sipping slowly will not stretch your new stomach. Drinking too fast or gulping liquids will cause brief discomfort and early could cause staple line disruption (leak). With eating, tiny bites, then chew, chew, chew, and swallow. Lay your fork/spoon down for 2-3 minutes, and then take your next bite. Your pouch will tell you within 1-2 bites if it is going to tolerate what you are eating.   Protein Vendors:  Refer to protein vendors' handout from consult class. You can always find protein drinks at the bariatric office, grocery stores, [employer], drug [employer], [employer], health food [employer], and on the Internet. Find one high in protein (15-30 grams per serving) and low carb (less than 18 grams per serving).  Now is a great time to re-read your . Please review specific instructions given to you at discharge by your physician (surgeon).  HOW/WHEN TO CONTACT US:  It is imperative that you contact us with any of the following:    Ÿ fever greater than 101 degrees  Ÿ shortness of breath  Ÿ leg swelling  Ÿ body aches  Ÿ shaking chills  Ÿ nausea and vomitting  Ÿ pain that has worsened  Ÿ redness at incision sites  Ÿ pus or foul smelling drainage from an incision or wound  Ÿ inability to keep fluids down for more than a day  Ÿ any other condition you feel needs our attention.  [facility] - Bariatric: [phone] call this number anytime 24 hours a day / 7 days a week.  Teach-back Questions to be answered by the patient prior to discharge.   What complications would prompt you to call your doctor when you return home? _________________    What is the purpose of your prescribed medication? ________________  What are some potential side effects of the medications you will be taking at home? _______________

## 2025-07-10 NOTE — PLAN OF CARE
Problem: Adult Inpatient Plan of Care  Goal: Plan of Care Review  Outcome: Progressing  Goal: Patient-Specific Goal (Individualized)  Outcome: Progressing  Goal: Absence of Hospital-Acquired Illness or Injury  Outcome: Progressing  Intervention: Identify and Manage Fall Risk  Recent Flowsheet Documentation  Taken 7/10/2025 0020 by Holli Singh LPN  Safety Promotion/Fall Prevention: safety round/check completed  Taken 7/9/2025 2230 by Holli Singh LPN  Safety Promotion/Fall Prevention: safety round/check completed  Taken 7/9/2025 2053 by Holli Singh LPN  Safety Promotion/Fall Prevention:   safety round/check completed   room organization consistent   nonskid shoes/slippers when out of bed   fall prevention program maintained   clutter free environment maintained   assistive device/personal items within reach  Intervention: Prevent Skin Injury  Recent Flowsheet Documentation  Taken 7/10/2025 0020 by Holli Singh LPN  Body Position: position changed independently  Taken 7/9/2025 2230 by Holli Singh LPN  Body Position: position changed independently  Taken 7/9/2025 2053 by Holli Singh LPN  Body Position: position changed independently  Skin Protection: incontinence pads utilized  Intervention: Prevent and Manage VTE (Venous Thromboembolism) Risk  Recent Flowsheet Documentation  Taken 7/9/2025 2053 by Holli Singh LPN  VTE Prevention/Management: SCDs (sequential compression devices) on  Intervention: Prevent Infection  Recent Flowsheet Documentation  Taken 7/9/2025 2053 by Holli Singh LPN  Infection Prevention:   single patient room provided   personal protective equipment utilized  Goal: Optimal Comfort and Wellbeing  Outcome: Progressing  Intervention: Provide Person-Centered Care  Recent Flowsheet Documentation  Taken 7/9/2025 2053 by Holli Singh LPN  Trust Relationship/Rapport:   care explained   thoughts/feelings acknowledged   questions encouraged  Goal: Readiness for  Transition of Care  Outcome: Progressing   Goal Outcome Evaluation:

## 2025-07-10 NOTE — DISCHARGE SUMMARY
"Discharge Summary    Patient name: Manish Castillo    Medical record number: [id number]    Admission date: 7/9/2025  Discharge date:      Attending physician: Dr. Little Carver    Primary care physician: Provider, No Known    Referring physician: Little Carver MD  [address] [place] [address]    Condition on discharge: Stable    Primary Diagnoses:  Morbid obesity with co-morbidities    Operative Procedure: Robotic Laparoscopic sleeve gastrectomy     Manish Castillo  is post op day one status post procedure listed. Patient denies shortness of air and lower extremity pain. Feels better than yesterday. No vomiting this am. Ambulating well and using incentive spirometer.          /87 (BP Location: Left arm, Patient Position: Lying)   Pulse 74   Temp 98.6 °F (37 °C) (Oral)   Resp 20   Ht 190.5 cm (75\")   Wt (!) 153 kg (336 lb 4.8 oz)   SpO2 98%   BMI 42.03 kg/m²     General:  alert, appears stated age and cooperative   Abdomen: soft, bowel sounds active, appropriate tenderness   Incision:   healing well, no drainage, no erythema, no hernia, no seroma, no swelling, no dehiscence, incision well approximated   Heart: Regular rate   Lungs: Clear to auscultation bilaterally     I reviewed the patient's new clinical results.     Lab Results (last 24 hours)       Procedure Component Value Units Date/Time    Tissue Pathology Exam [[id number]] Collected: 07/09/25 1213    Specimen: Tissue from Stomach, Greater curvature Updated: 07/10/25 0754    Comprehensive Metabolic Panel [[id number]]  (Abnormal) Collected: 07/09/25 1801    Specimen: Blood from Arm, Left Updated: 07/09/25 1838     Glucose 197 mg/dL      BUN 25.9 mg/dL      Creatinine 1.11 mg/dL      Sodium 135 mmol/L      Potassium 3.5 mmol/L      Comment: Specimen hemolyzed.  Result may be falsely elevated.        Chloride 100 mmol/L      CO2 17.6 mmol/L      Calcium 9.5 mg/dL      Total Protein 7.2 g/dL      Albumin 4.2 g/dL      ALT (SGPT) 133 U/L  "     AST (SGOT) 65 U/L      Comment: Specimen hemolyzed.  Result may be falsely elevated.        Alkaline Phosphatase 52 U/L      Total Bilirubin 0.4 mg/dL      Globulin 3.0 gm/dL      A/G Ratio 1.4 g/dL      BUN/Creatinine Ratio 23.3     Anion Gap 17.4 mmol/L      eGFR 85.0 mL/min/1.73     Narrative:      GFR Categories in Chronic Kidney Disease (CKD)              GFR Category          GFR (mL/min/1.73)    Interpretation  G1                    90 or greater        Normal or high (1)  G2                    60-89                Mild decrease (1)  G3a                   45-59                Mild to moderate decrease  G3b                   30-44                Moderate to severe decrease  G4                    15-29                Severe decrease  G5                    14 or less           Kidney failure    (1)In the absence of evidence of kidney disease, neither GFR category G1 or G2 fulfill the criteria for CKD.    eGFR calculation 2021 CKD-EPI creatinine equation, which does not include race as a factor    Magnesium [[id number]]  (Normal) Collected: 07/09/25 1801    Specimen: Blood from Arm, Left Updated: 07/09/25 1838     Magnesium 1.9 mg/dL     Phosphorus [[id number]]  (Normal) Collected: 07/09/25 1801    Specimen: Blood from Arm, Left Updated: 07/09/25 1834     Phosphorus 4.0 mg/dL     CBC & Differential [[id number]]  (Abnormal) Collected: 07/09/25 1801    Specimen: Blood from Arm, Left Updated: 07/09/25 1811    Narrative:      The following orders were created for panel order CBC & Differential.  Procedure                               Abnormality         Status                     ---------                               -----------         ------                     CBC Auto Differential[[id number]]        Abnormal            Final result                 Please view results for these tests on the individual orders.    CBC Auto Differential [[id number]]  (Abnormal) Collected: 07/09/25 1801    Specimen: Blood from Arm,  Left Updated: 07/09/25 1811     WBC 10.75 10*3/mm3      RBC 5.00 10*6/mm3      Hemoglobin 14.9 g/dL      Hematocrit 43.7 %      MCV 87.4 fL      MCH 29.8 pg      MCHC 34.1 g/dL      RDW 12.1 %      RDW-SD 38.9 fl      MPV 10.9 fL      Platelets 241 10*3/mm3      Neutrophil % 91.7 %      Lymphocyte % 6.3 %      Monocyte % 1.4 %      Eosinophil % 0.0 %      Basophil % 0.2 %      Immature Grans % 0.4 %      Neutrophils, Absolute 9.86 10*3/mm3      Lymphocytes, Absolute 0.68 10*3/mm3      Monocytes, Absolute 0.15 10*3/mm3      Eosinophils, Absolute 0.00 10*3/mm3      Basophils, Absolute 0.02 10*3/mm3      Immature Grans, Absolute 0.04 10*3/mm3      nRBC 0.0 /100 WBC                Assessment:      Doing well postoperatively.      Plan:   1. Continue Stage 1 diet  2. Continue with ambulation and Incentive spirometry  3. Plan for d/c home      Hospital Course: The patient is a very pleasant 44 y.o. male that was admitted to the hospital with with morbid obesity underwent a laparoscopic gastric sleeve.  The next morning the patient was able to tolerate liquids and was ambulating and vital signs and labs were stable.  The patient is discharged home with follow-up in my office next week.      Discharge medications:      Discharge Medications        New Medications        Instructions Start Date   apixaban 2.5 MG tablet tablet  Commonly known as: ELIQUIS   2.5 mg, Oral, 2 Times Daily      HYDROmorphone 2 MG tablet  Commonly known as: Dilaudid   2 mg, Oral, Every 6 Hours PRN      ondansetron ODT 8 MG disintegrating tablet  Commonly known as: ZOFRAN-ODT   8 mg, Translingual, Every 8 Hours PRN      ursodiol 250 MG tablet  Commonly known as: ACTIGALL   250 mg, Oral, 2 Times Daily             Continue These Medications        Instructions Start Date   CHOLEST OFF PO   Take  by mouth.      Fish Oil 435 MG capsule   Take  by mouth.      hydroCHLOROthiazide 25 MG tablet   25 mg, Oral, Daily      losartan 50 MG tablet  Commonly known  as: COZAAR   50 mg, Oral, Daily      metoprolol succinate XL 50 MG 24 hr tablet  Commonly known as: TOPROL-XL   50 mg, Oral, Daily      MULTI ADULT GUMMIES PO   Take  by mouth.      vitamin B-6 50 MG tablet  Commonly known as: PYRIDOXINE   50 mg, Daily               Discharge instructions:  Per Bariatric manual; per our protocol      Follow-up appointment: Follow up with Dr. Carver in the office as scheduled.  If not already scheduled call for appointment at [phone]

## 2025-07-11 LAB
LAB AP CASE REPORT: NORMAL
PATH REPORT.FINAL DX SPEC: NORMAL
PATH REPORT.GROSS SPEC: NORMAL

## 2025-07-11 NOTE — CASE MANAGEMENT/SOCIAL WORK
Case Management Discharge Note      Final Note: HOME  DISCHARGED PRIOR TO CASE MANAGEMENT SEEING PATIENT         Selected Continued Care - Discharged on 7/10/2025 Admission date: 7/9/2025 - Discharge disposition: Home or Self Care            Transportation Services  Transportation: Private Transportation  Private: Car    Final Discharge Disposition Code: 01 - home or self-care

## 2025-07-16 ENCOUNTER — OFFICE VISIT (OUTPATIENT)
Dept: BARIATRICS/WEIGHT MGMT | Facility: CLINIC | Age: 45
End: 2025-07-16
Payer: MEDICARE

## 2025-07-16 VITALS
HEIGHT: 75 IN | WEIGHT: 315 LBS | DIASTOLIC BLOOD PRESSURE: 83 MMHG | OXYGEN SATURATION: 99 % | SYSTOLIC BLOOD PRESSURE: 133 MMHG | HEART RATE: 71 BPM | BODY MASS INDEX: 39.17 KG/M2

## 2025-07-16 DIAGNOSIS — E66.813 OBESITY, CLASS III, BMI 40-49.9 (MORBID OBESITY): Primary | ICD-10-CM

## 2025-07-16 DIAGNOSIS — Z90.3 S/P GASTRIC SLEEVE PROCEDURE: ICD-10-CM

## 2025-07-16 PROCEDURE — 3075F SYST BP GE 130 - 139MM HG: CPT | Performed by: NURSE PRACTITIONER

## 2025-07-16 PROCEDURE — 3079F DIAST BP 80-89 MM HG: CPT | Performed by: NURSE PRACTITIONER

## 2025-07-16 PROCEDURE — 99024 POSTOP FOLLOW-UP VISIT: CPT | Performed by: NURSE PRACTITIONER

## 2025-07-16 PROCEDURE — 1160F RVW MEDS BY RX/DR IN RCRD: CPT | Performed by: NURSE PRACTITIONER

## 2025-07-16 PROCEDURE — 1159F MED LIST DOCD IN RCRD: CPT | Performed by: NURSE PRACTITIONER

## 2025-07-16 NOTE — PROGRESS NOTES
[facility] BARIATRIC SURGERY  [address] [place] [address]  [address] [place] [address]  Dept: [phone]  7/16/2025      Manish NAGEL Castillo.  [id number]  [id number]  1980  male      Chief Complaint   Patient presents with    Post-op     1 W GS 7/9/2025     Gastric Sleeve Laparoscopic With Davinci Robot  7/9/2025     Post-Op Bariatric Surgery:     HPI:   Weight loss in the last week:11 pounds     Wt Readings from Last 10 Encounters:   07/16/25 (!) 147 kg (325 lb)   07/09/25 (!) 153 kg (336 lb 4.8 oz)   06/18/25 (!) 156 kg (344 lb)   06/04/25 (!) 158 kg (349 lb)   05/23/25 (!) 160 kg (352 lb 3.2 oz)   04/04/25 (!) 160 kg (352 lb)   03/05/25 (!) 160 kg (353 lb)   02/03/25 (!) 159 kg (350 lb)   01/13/25 (!) 158 kg (348 lb)   12/16/24 (!) 157 kg (346 lb)   Tolerating po fluids well , sf jello pack, chicken broth   Minimal abdominal pain  No nausea or vomiting     Review of Systems   Constitutional:  Positive for activity change, appetite change and fatigue.   Respiratory: Negative.     Cardiovascular: Negative.    Gastrointestinal: Negative.    Musculoskeletal: Negative.          Past Medical History:   Diagnosis Date    Constipation     Depression     Episodic mood disorder     Exercise-induced leg cramps     GERD (gastroesophageal reflux disease)     Hair loss     Heartburn     History of psychiatric hospitalization     Hypertension     Marijuana user     sober since 7/2024    Panic attacks     Pre-diabetes     PTSD (post-traumatic stress disorder)     Shortness of breath on exertion     Sleep apnea     NO MASK     Substance abuse     past and present per packet    Tired     Weight gain      Past Surgical History:   Procedure Laterality Date    CARDIAC CATHETERIZATION N/A 1/27/2021    Procedure: Left Heart Cath;  Surgeon: Darren Nice MD;  Location:  [facility];  Service: Cardiovascular;  Laterality:  N/A;    ENDOSCOPY N/A 7/1/2021    Procedure: ESOPHAGOGASTRODUODENOSCOPY with biopsy;  Surgeon: Little Carver MD;  Location: [facility] ENDOSCOPY;  Service: General;  Laterality: N/A;  post : normal anatomy with biopsy    GASTRIC SLEEVE LAPAROSCOPIC N/A 7/9/2025    Procedure: GASTRIC SLEEVE LAPAROSCOPIC WITH DAVINCI ROBOT;  Surgeon: Little Carver MD;  Location: [facility] MAIN OR;  Service: Robotics - DaVinci;  Laterality: N/A;    MOUTH SURGERY      TONSILLECTOMY  1993    WRIST SURGERY      lead from pencil removed from wrist         The following portions of the patient's history were reviewed and updated as appropriate: allergies, current medications, past family history, past medical history, past social history, past surgical history, and problem list.    Vitals:    07/16/25 0957   BP: 133/83   Pulse: 71   SpO2: 99%       Physical Exam  Constitutional:       Appearance: Normal appearance. He is obese.   Pulmonary:      Effort: Pulmonary effort is normal.   Abdominal:      General: Abdomen is flat.      Comments: Incisions clean, dry and intact    Skin:     General: Skin is warm and dry.   Neurological:      General: No focal deficit present.      Mental Status: He is alert.   Psychiatric:         Mood and Affect: Mood normal.         Behavior: Behavior normal.         Thought Content: Thought content normal.         Judgment: Judgment normal.           Assessment:   Patient Active Problem List   Diagnosis    Morbid obesity    BMI 45.0-49.9, adult    Pre-operative examination    Dyspnea on exertion    Preoperative cardiovascular examination    Abnormal EKG    Essential hypertension    Abnormal nuclear stress test    Obesity, Class III, BMI 40-49.9 (morbid obesity)    Pre-diabetes       Post-op, the patient is doing well. He is not having any nausea or vomiting. Tolerating po fluids well. Diet progression reviewed with pt and handout given. Plan to follow up in 5 months for 6 month post op apt.     Plan:   Reviewed with  patient the importance of following the manual for diet progression. Increase activity as tolerated. Continue increasing daily intake of protein and water.   Return to work: depending on pt's job and job requirements - this was discussed individually with each patient  Activity restrictions: no lifting, pushing or pulling over 25lbs for 3 weeks.   Recommended patient be sure to get at least 70 grams of protein per day. Discussed with the patient the recommended amount of water per day to intake. Reviewed vitamin requirements. Be sure to do routine exercise and increase activity as tolerated. No asa, nsaids or steroids for 8 weeks. Patient may use miralax as needed if necessary.     Instructions / Recommendations: dietary counseling recommended, recommended a daily protein intake of  grams, vitamin supplement(s) recommended, recommended exercising at least 150 minutes per week, behavior modifications recommended and instructed to call the office for concerns, questions, or problems.     The patient was instructed to follow up at 6 month follow up appt.     The patient was counseled regarding post op bariatric manual      JOSUÉ Osei  [facility] bariatrics

## 2025-07-28 ENCOUNTER — TELEPHONE (OUTPATIENT)
Dept: BARIATRICS/WEIGHT MGMT | Facility: CLINIC | Age: 45
End: 2025-07-28
Payer: MEDICARE

## 2025-07-28 NOTE — TELEPHONE ENCOUNTER
FYI Pt called with post op food intake questions. Questions were answered and pt understood following the bariatric binder as much as possible/pt voiced understanding.

## (undated) DEVICE — SYRINGE,TOOMEY,IRRIGATION,70CC,STERILE: Brand: MEDLINE

## (undated) DEVICE — NEEDLE, QUINCKE, 20GX3.5": Brand: MEDLINE

## (undated) DEVICE — DECANTER: Brand: UNBRANDED

## (undated) DEVICE — RADIFOCUS GLIDEWIRE: Brand: GLIDEWIRE

## (undated) DEVICE — CATH DIAG IMPULSE FR4 6F 100CM

## (undated) DEVICE — SUT VIC 0 UR6 27IN VCP603H

## (undated) DEVICE — PINNACLE INTRODUCER SHEATH: Brand: PINNACLE

## (undated) DEVICE — GLIDESHEATH SLENDER ACCESS KIT: Brand: GLIDESHEATH SLENDER

## (undated) DEVICE — COLUMN DRAPE

## (undated) DEVICE — LAPAROVUE VISIBILITY SYSTEM LAPAROSCOPIC SOLUTIONS: Brand: LAPAROVUE

## (undated) DEVICE — THE STERILE LIGHT HANDLE COVER IS USED WITH STERIS SURGICAL LIGHTING AND VISUALIZATION SYSTEMS.

## (undated) DEVICE — ST TBG AIRSEAL BIF FLTR W/ACT/CHARCOAL/FLTR

## (undated) DEVICE — MAT PREVALON MOBL TRANSFR AIR W/PAD REPROC 39X81IN

## (undated) DEVICE — VESSEL SEALER EXTEND: Brand: ENDOWRIST

## (undated) DEVICE — GOWN,REINF,POLY,ECL,PP SLV,XL,XLONG: Brand: MEDLINE

## (undated) DEVICE — SUT PDS 3/0 PLS MONO 1X27IN SH VIL PDP316H

## (undated) DEVICE — THE STERILE CAMERA HANDLE COVER IS FOR USE WITH THE STERIS SURGICAL LIGHTING AND VISUALIZATION SYSTEMS.

## (undated) DEVICE — PAD, GROUNDING, UNIVERSAL, SPLIT, 9': Brand: MEDLINE

## (undated) DEVICE — PK TRY HEART CATH 50

## (undated) DEVICE — ABDOMINAL BINDER: Brand: DEROYAL

## (undated) DEVICE — SEAL

## (undated) DEVICE — CATH DIAG IMPULSE FL4 6F 100CM

## (undated) DEVICE — VISIGI 3D®  CALIBRATION SYSTEM  SIZE 40FR STD W/ BULB: Brand: BOEHRINGER® VISIGI 3D™ SLEEVE GASTRECTOMY CALIBRATION SYSTEM, SIZE 40FR W/BULB

## (undated) DEVICE — ENDOPATH PNEUMONEEDLE INSUFFLATION NEEDLES WITH LUER LOCK CONNECTORS 120MM: Brand: ENDOPATH

## (undated) DEVICE — BLADELESS OBTURATOR: Brand: WECK VISTA

## (undated) DEVICE — GLV SURG SENSICARE PI ORTHO SZ7.5 LF STRL

## (undated) DEVICE — REDUCER: Brand: ENDOWRIST

## (undated) DEVICE — GLOVE,SURG,SENSICARE SLT,LF,PF,7.5: Brand: MEDLINE

## (undated) DEVICE — STAPLER 60: Brand: SUREFORM

## (undated) DEVICE — TROC BLADLES AIRSEAL/OPTI THRD 8X120MM 1P/U

## (undated) DEVICE — SKIN PREP TRAY W/CHG: Brand: MEDLINE INDUSTRIES, INC.

## (undated) DEVICE — BND COMPR ZEPHYR VASC LG

## (undated) DEVICE — PK ENDO GI 50

## (undated) DEVICE — CATH DIAG IMPULSE PIG .056 6F 110CM

## (undated) DEVICE — PAPR PRNT PK SONY W RIBN UPC55

## (undated) DEVICE — PASS SUT PRO BARIATRIC XL W/TROC SWABS

## (undated) DEVICE — ANESTH CIRCUIT 60IN 3LTR-LF: Brand: MEDLINE INDUSTRIES, INC.

## (undated) DEVICE — PK BARIATRIC 50

## (undated) DEVICE — ARM DRAPE

## (undated) DEVICE — SYR LUERLOK 50ML

## (undated) DEVICE — DRAPE, RADIAL, STERILE: Brand: MEDLINE

## (undated) DEVICE — KT SURG TURNOVER 050

## (undated) DEVICE — RADIFOCUS OPTITORQUE ANGIOGRAPHIC CATHETER: Brand: OPTITORQUE

## (undated) DEVICE — BITEBLOCK ENDO W/STRAP 60F A/ LF DISP

## (undated) DEVICE — ST ACC MICROPUNCTURE STFF/CANN PLAT/TP 4F 21G 40CM